# Patient Record
(demographics unavailable — no encounter records)

---

## 2024-11-30 NOTE — REVIEW OF SYSTEMS
[Fatigue] : fatigue [Constipation] : constipation [Diarrhea: Grade 0] : Diarrhea: Grade 0 [Incontinence] : incontinence [Muscle Weakness] : muscle weakness [Difficulty Walking] : difficulty walking [Hot Flashes] : hot flashes [Chills] : no chills [Chest Pain] : no chest pain [Palpitations] : no palpitations [Lower Ext Edema] : no lower extremity edema [Shortness Of Breath] : no shortness of breath [Cough] : no cough [Abdominal Pain] : no abdominal pain [Vomiting] : no vomiting [Dysuria] : no dysuria [Joint Pain] : no joint pain [Muscle Pain] : no muscle pain [Skin Rash] : no skin rash [FreeTextEntry2] : + fevers [FreeTextEntry7] : + diarrhea [de-identified] : + numbness in fingertips bilaterally

## 2024-11-30 NOTE — ASSESSMENT
[FreeTextEntry1] : 88 year old male with PMHx CAD s/p stent placement on aspirin and plavix, diabetes, and metastatic hormone sensitive prostate cancer with elevated PSA 196ng/ml, bone scan showed extensive bone lesions. He also p/w worsening back pain, was seen by Rad Onc s/p palliative radiation therapy x 5 frax with Dr. Ghotra on 3/24/23. Started on Eligard March 2023. CT chest 3/9/23 showing nonspecific subpleural and fissural nodules; need following up. CT abd/p 3/14/23 diffuse osseous heterogeneity. Bone density 3/19/23 no e/o osteoporosis. NM bone scan 6/28/23 showing Metastatic disease to the spine, pelvis, and ribs. A left lower anterior rib lesion has decreased in intensity since the previous study and the sternal manubrial focus is no longer appreciated. The remainder of the abnormalities are unchanged. no new osseous abnormalities. PSA inc, c/w mCRPC 10/31/23 CT chest/abd/p without IV contrast showing A few small low-density nodules measuring up to 1.5 cm are seen within the subcutaneous tissues of the right anterior lateral abdominal wall, thought likely related to sites of subcutaneous injection. Subcentimeter subpleural and helen fissural pulmonary nodules, without significant interval change or more ground glass in appearance on the current exam. Osseous metastatic disease again appreciated. New and/or more sclerotic lesions within the left proximal femur, the left acetabulum, the left hemisacrum, and several vertebral bodies and additional sclerotic lesions throughout the visualized osseous structures which do not appear significantly changed. -10/31/23 NM bone scan showing metastatic disease in the spine, pelvis and left lower rib are not significantly changed compared to the previous bone scan dated 6/28/2023. No new osseous lesions. 10/30/23 [facility] showed CHK2 S356 VAF 4.8%, APC and AR inversion Started abiraterone around 10/20/23.   mCRPC - continue Eligard [id number] with Urology/Dr. Welsh, latest on 3/1/24, next dose 9/6/24 - CT Chest and AP done in ED on 2/16/24: No lymphadenopathy.  Stable appearance of multiple sclerotic osseous metastases  most conspicuous in the T4, T9-T10, and L1 vertebral bodies, the left sacral ala, and the left posterior acetabulum.  - 2/26/24 MR CTL spine: Lesion within the L3 spinous process and T5 vertebral body were not present on prior exam from 2023. - PSA has been uptrending, 4. 17 on 11/17/23 , 4.71 on 12/11/23, 3.79 on 1/5/24, 4.99 on 1/19/24, 5.84 on 2/16,  PSA continues to rise to 24.  CT AP with IVC from ED visit 4/14/24 found no acute abnormality.  CT chest 04/2024 showed small b/l pleural effusions but UMAIR. Bone scan done in [place] showed continued  - Bone scan done in [facility] showed T3 to T6, T9, T10, L1, L4, sacrum, rib cage and left iliac bone. He is receiving Xgeva q4 weeks, and will receive a dose today. - Referral for Endocrinology (Dr. Rubio) for potential hypercalcemia management - Has been on abiraterone and dexamethasone (was recently switched from prednisone). 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 Could potentially increase dose if he tolerates. Pt and daughters are in agreement. his PSA is rapidly rising and MRI spine in July showed progression of disease. stopped olaparib.  -followed up with outside oncologist for further management  - Poor PS and leg weakness - will need physical therapy, recommend 1 hour daily for 5 days in one week - need a electric driven wheelchair for moving around - Discussion about Pluvicto physilogy and potential adverse effects and complications discussed with Dr. Marmolejo. - However, due to poor performance status, Pt/daughter encouraged to pursue Palliative care given his extensive disease.   Back pain - persistent but controlled on oxycodone -refer to Dr. Ghotra for palliative RT eval - continue percoct 5-325 1 tab every 6 hours as needed - recommended bowel regimen with Miralax - continue lidocaine patches   RTC in 6 weeks as needed

## 2024-11-30 NOTE — PHYSICAL EXAM
[Capable of only limited self care, confined to bed or chair more than 50% of waking hours] : Status 3- Capable of only limited self care, confined to bed or chair more than 50% of waking hours [Normal] : affect appropriate [de-identified] : anicteric  [de-identified] : non tender, FROM  [de-identified] : no spinal or paraspinal TTP [de-identified] : no edema  [de-identified] : + decreased sensation in fingertips bilaterally, upper extremity strength intact 5/5 bilaterally

## 2024-11-30 NOTE — PHYSICAL EXAM
[Capable of only limited self care, confined to bed or chair more than 50% of waking hours] : Status 3- Capable of only limited self care, confined to bed or chair more than 50% of waking hours [Normal] : affect appropriate [de-identified] : anicteric  [de-identified] : non tender, FROM  [de-identified] : no edema  [de-identified] : no spinal or paraspinal TTP [de-identified] : + decreased sensation in fingertips bilaterally, upper extremity strength intact 5/5 bilaterally

## 2024-11-30 NOTE — ASSESSMENT
[FreeTextEntry1] : 88 year old male with PMHx CAD s/p stent placement on aspirin and plavix, diabetes, and metastatic hormone sensitive prostate cancer with elevated PSA 196ng/ml, bone scan showed extensive bone lesions. He also p/w worsening back pain, was seen by Rad Onc s/p palliative radiation therapy x 5 frax with Dr. Ghotra on 3/24/23. Started on Eligard March 2023. CT chest 3/9/23 showing nonspecific subpleural and fissural nodules; need following up. CT abd/p 3/14/23 diffuse osseous heterogeneity. Bone density 3/19/23 no e/o osteoporosis. NM bone scan 6/28/23 showing Metastatic disease to the spine, pelvis, and ribs. A left lower anterior rib lesion has decreased in intensity since the previous study and the sternal manubrial focus is no longer appreciated. The remainder of the abnormalities are unchanged. no new osseous abnormalities. PSA inc, c/w mCRPC 10/31/23 CT chest/abd/p without IV contrast showing A few small low-density nodules measuring up to 1.5 cm are seen within the subcutaneous tissues of the right anterior lateral abdominal wall, thought likely related to sites of subcutaneous injection. Subcentimeter subpleural and helen fissural pulmonary nodules, without significant interval change or more ground glass in appearance on the current exam. Osseous metastatic disease again appreciated. New and/or more sclerotic lesions within the left proximal femur, the left acetabulum, the left hemisacrum, and several vertebral bodies and additional sclerotic lesions throughout the visualized osseous structures which do not appear significantly changed. -10/31/23 NM bone scan showing metastatic disease in the spine, pelvis and left lower rib are not significantly changed compared to the previous bone scan dated 6/28/2023. No new osseous lesions. 10/30/23 [facility] showed CHK2 S356 VAF 4.8%, APC and AR inversion Started abiraterone around 10/20/23.   mCRPC - continue Eligard [id number] with Urology/Dr. Welsh, latest on 3/1/24, next dose 9/6/24 - CT Chest and AP done in ED on 2/16/24: No lymphadenopathy.  Stable appearance of multiple sclerotic osseous metastases  most conspicuous in the T4, T9-T10, and L1 vertebral bodies, the left sacral ala, and the left posterior acetabulum.  - 2/26/24 MR CTL spine: Lesion within the L3 spinous process and T5 vertebral body were not present on prior exam from 2023. - PSA has been uptrending, 4. 17 on 11/17/23 , 4.71 on 12/11/23, 3.79 on 1/5/24, 4.99 on 1/19/24, 5.84 on 2/16,  PSA continues to rise to 24.  CT AP with IVC from ED visit 4/14/24 found no acute abnormality.  CT chest 04/2024 showed small b/l pleural effusions but UMAIR. Bone scan done in [place] showed continued  - Bone scan done in [facility] showed T3 to T6, T9, T10, L1, L4, sacrum, rib cage and left iliac bone. He is receiving Xgeva q4 weeks, and will receive a dose today. - Referral for Endocrinology (Dr. Rubio) for potential hypercalcemia management - Has been on abiraterone and dexamethasone (was recently switched from prednisone). 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 Could potentially increase dose if he tolerates. Pt and daughters are in agreement. his PSA is rapidly rising and MRI spine in July showed progression of disease. stopped olaparib.  -followed up with outside oncologist for further management  - Poor PS and leg weakness - will need physical therapy, recommend 1 hour daily for 5 days in one week - need a electric driven wheelchair for moving around - Discussion about Pluvicto physilogy and potential adverse effects and complications discussed with Dr. Marmolejo. - However, due to poor performance status, Pt/daughter encouraged to pursue Palliative care given his extensive disease.   Back pain - persistent but controlled on oxycodone -refer to Dr. Ghotra for palliative RT eval - continue percoct 5-325 1 tab every 6 hours as needed - recommended bowel regimen with Miralax - continue lidocaine patches   RTC in 6 weeks as needed

## 2024-11-30 NOTE — HISTORY OF PRESENT ILLNESS
"Assumed care of pt @0700. Bedside report received. Pt AOX 4. Pt complains pain. Med per MAR. Pt states: \" I felt in the bathroom last night when I was transferring from toilet to chair. I felt on my right stump. I didn't hit my head or anything else.\" MD notified. Pt refuses alarm despite education. Pt agrees to call before transferring from and to chair.CMS +Fall precaution in place. POC discussed with pt, all questions answered at this time. Pt makes needs known, call light within reach, hourly rounding in place.   " [Disease: _____________________] : Disease: [unfilled] [T: ___] : T[unfilled] [N: ___] : N[unfilled] [M: ___] : M[unfilled] [AJCC Stage: ____] : AJCC Stage: [unfilled] [de-identified] : Fabian Balbuena is an 87 years old M with metastatic hormone sensitive prostate cancer.  He had the initial medical oncology consultation on 3/9/23: history of elevated PSA, 8.34 ng/mL on 5/23/2019. He had a PSA of 10.80 ng/mL on 9/14/2021. He was planned to have a prostate biopsy, however, due to pandemic, his biopsy was held.  PSA 1/23/23 99.4ng/ml 3/3/23: 196ng/ml  Bone scan 3/1/2023 -- diffuse foci of osseous metastasis with uptake in ribs, cervical, thoracic and lumbar spine, manubrium, sacrum, pelvis. Most prominently in thoracic spine.  Reports intermittent midback pain, 4/10, mild intermittent bilateral elbow pain and lower leg pain, normal appetite, 10lbs weight loss in 3 weeks. Urine flow is normal, no hesitancy, urgency, gross hematuria and burning. Nocturia 2 to 3. He was started on bicalutamide on March 13, 2023, eligard inj on Feb 17 with Dr. Welsh. He will continue to have bicalutamide in a total of one month. Patient declined prostate biopsy.  interval history 5/1/23 He and his daughter report that he finished Radiation therapy 3/24/23 with Dr. Ghotra. He finished bicalutamide around 3/2023. He went to [facility] 4/1/23 and had lab there showing PSA 0.64 on 4/18/23; he had bilateral hip persistent pain 6/10 which was alleviated with Tramadol and acetaminophen prn prescribed by physician at [facility]. He denied cough, chest pain, upper back pain, hot flashing, night sweats. Admits nocturia 4/night as his baseline but denied increased urgency/frequency and hematuria. Appetite is ok. He has dry stools after starting tramadol which has been improved with docusate. Admits walks 2000 steps/1.3 kilometer without difficulty.  6/26/23 reports right flank pain radiating to the mid abdomen, denies gross hematuria. Bilateral hip pain was resolved. Denies constipation.  8/14/23 Resolved right flank pain due to kidney stone which was released. Denies fatigue, hot flashes and sweating.  10/20/23 Denied bone pain, significant changes with urination, N/V/D, abd pain. Admits bilateral interphalangeal joints mild pain and stiffness especially in the morning. Outside lab by PCP on 10/12/23 PSA 2.23; T4 free 1.5 TSH 1.24 VitD -25 OH level 30 (normal range ) Cr 1.24. Denied SOB, chest pain on slow walking.   11/17/23 -10/31/23 CT chest/abd/p without IV contrast showing A few small low-density nodules measuring up to 1.5 cm are seen within the subcutaneous tissues of the right anterior lateral abdominal wall, thought likely related to sites of subcutaneous injection. Subcentimeter subpleural and helen fissural pulmonary nodules, without significant interval change or more ground glass in appearance on the current exam. Osseous metastatic disease again appreciated. New and/or more sclerotic lesions within the left proximal femur, the left acetabulum, the left hemisacrum, and several vertebral bodies and additional sclerotic lesions throughout the visualized osseous structures which do not appear significantly changed. -10/31/23 NM bone scan showing metastatic disease in the spine, pelvis and left lower rib are not significantly changed compared to the previous bone scan dated 6/28/2023. No new osseous lesions. He started abiraterone and prednisone on Oct 23, 2023 because of newly diagnosed mCRPC. He reports that he had intermittent lower abd "needle pinching pain" before urination at night about 2wks ago. Admits one formed bowel movement 3hr after the morning dose of medication. He normally has one to two formed bowel movments. Denied fatigue, hot flashes, N/V. Good appetite.  12/11/23 feels fine, denies fatigue, leg swelling and shortness of breath.   1/5/24: Appetite and energy are good. When it was warmer out he was walking 9-10K steps a day, now that it is colder he is walking 2-3K steps a day. He has mild chronic mid back pain, does not take anything for it. He notes some joint pain in a few fingers on b/l hands. Nocturia 4-5. Has a trip to [place] planned 3/16 - 4/4. Patient denies fever, cough, shortness of breath, chest pain, palpitations, abdominal pain, nausea/vomiting, diarrhea, constipation, dysuria, hematuria, itching, rashes, hot flashes.  1/19/24 He reports that his energy level comes back. Denies hot flashes, SOB, swelling on legs, N/V/D, changes with urination. /69 this morning.  He walks 7blocks and plays [employer] game with friends daily.   2/16/24: Patient has had chronic back pain, has h/o RT to spine about one year ago. Last month when patient was seen his pain was manageable, however in the last two weeks it has become much worse. The pain is located in his mid back, it is rated as an 8 out of 10. It is impacting his quality of life, he does not want to do the things he normally does. HHe is also having difficulty sleeping. Yesterday he took tylenol and pain decreased to a 2-3. The pain does not radiate. It is pulsing in nature. Does not have any shooting pain down legs, denies saddle anesthesia. This morning he took tylenol again but it did not help as much. Patient says that he has had weakness in his legs for several months now, however in recent weeks he thinks his weakness has increased. When he walks he needs to rest often and he staggers occ. He refuses to use a cane. Patient reports in the last two weeks he had had occ numbness in his hands and feet. He denies urinary and bowel incontinence. Does report urinary urgency.    Patient says that since being seen last month his appetite is diminished, says all of a sudden he just has not felt like eating. He is having acid reflux and gas. Patient is constipated and has "tinges of red" in stool. His stool is very hard, only drinks 4 cups of water per day.  Pt was seen in hospital on 2/16. CT CAP done with no acute path or change in disease. Spine was not imaged. 2/28/24 spoke with pt's daughter t's back pain has improved, rated as a 2 out of 10. Lidocaine patches really help him. Has not used oxycodone. MRIs show no evidence of spinal cord compression. Show new small lesion at L3. On 2/28 daughter reported that they do not wish to have bone scan at this point, pt is exhausted from getting MRs. Will be going to [place] on 3/14. Wants to have bone scan after he returns.  3/13/24: Patient was diagnosed with shingles a little less than one week ago, says the rash was on the L side of his face near his mouth and on his cheek. He was started on valtrex by his PCP. Then on 3/10 pt developed 9 out of 10 pain on his face/L cheek radiating to the top of his head, he went to the ED, he was given tylenol and prescribed gabapentin. The pain has improved now, it is a 3 out of 10, it is not constant, he gets quick jolts of pain every so often. He has been taking gabapentin. Per daughter he was also prescribed abx and inhaler in the ED on 3/11, because in the last week he has had a cough and some congestion. CXR 3/10 shows "clear lungs", discharge summary does not state that abx or inhaler were Rxed to pt. He has been feeling better, has not had a fever, congestion and cough are improving, he has not had difficulty breathing. No chest pain or palpitations. In general pt is constipated and normally has BM once a day, says he has loose stool three times a day presently because he is on abx. Denies blood in stool and dark/black stools. He has urinary dribbling and urgency but denies incontinence. Reports nocturia x3. He is sleeping and eating well, energy is okay. Has occ reflux, pt is unsure if he is taking omeprazole and/or simethicone. He has hot flashes every 3 - 4 nights. His back pain has improved, it is very mild, he is not taking oxycodone nor is he using lidocaine patch at present.   3/29/24 Whole body bone scan done in [facility] showed T3 to T6, T9, T10, L1, L4, sacrum, rib cage and left iliac bone.  4/15/24: Patient is accompanied by his daughters. He has developed numbness in the fingertips for the last 3 weeks, which has persisted. He recently returned from [facility] 3 days ago. He went to the [facility] ED yesterday due to vomiting and diarrhea and had T100.7 in triage. UA negative. CXR clear. Blood cultures x 2 were sent and pending. CT AP w/IVC found no acute findings.  He received IV antibiotics and was discharged home.  4/22/24: Multiple heterogenous mets, largest L1 vertebral body with addition mets in the L3 spinous process and L S1 segment/sacral ala without epidural extension. [de-identified] : prostate adenocarcinoma  [de-identified] : 5/20/24: Pt accompanied by his daughters. He has been having R infrascapular pain that is getting worse. He has been taking oxycodone 2.5 mg usually twice a day for the pain. He also notes some constipation.  6/5/24 reports constipation, mild- mod fatigue, severe lower back pain and acid reflux. He has normal appetite and eats well.  His /79, denies headache, weakness in arms and legs and blurry visions.     6/19/24 reports BP 170s/70,80 and 90s, similar upper back pain on percocet 5-325 1 tablets twice daily, hot flash and sweating, weakness in the legs. Vomiting once last night  7/17/24 MRI spine showed multiple osseous metastases along the spine and sacrum which have increased in both number and size compared to the April MRI. No evidence of epidural extension of disease or pathological fracture. No cord compression  7/22/24 reports bilateral leg weakness, right lower abdominal pain and lower back shingle pain. He is not able to walk in last 10 days and sits in the wheel chair. He has poor appetite and poor PS.   8/30/24 pt arrived with daughter etta in [place]. Ambulating with a walker. Appetite is fair and maintaining his weight. No diarrhea or constipation. Previously on Lanpanza (Olaparib) which was discontinued due to musculoskeletal weakness and shingles. Presently not on any therapy for mCRPC.   9/27/24 reports leg muscle weakness, and had a fall last night when he went to bathroom. Endorses new pain in bilateral flanks 2 to 4/10. States postneck pain.   11/29/24 reports generalized weakness, barely walks around. Endorses sometimes lower abdominal pain due to constipation. He was started on xtandi about one month and planning to be on pluvicto trial in [place].

## 2024-11-30 NOTE — REASON FOR VISIT
[Follow-Up Visit] : a follow-up [Family Member] : family member [Ad Hoc ] : provided by an ad hoc  [FreeTextEntry2] : mCRPC  [FreeTextEntry3] : Translation by attending

## 2024-11-30 NOTE — REVIEW OF SYSTEMS
[Fatigue] : fatigue [Constipation] : constipation [Diarrhea: Grade 0] : Diarrhea: Grade 0 [Incontinence] : incontinence [Muscle Weakness] : muscle weakness [Difficulty Walking] : difficulty walking [Hot Flashes] : hot flashes [Chills] : no chills [Chest Pain] : no chest pain [Palpitations] : no palpitations [Lower Ext Edema] : no lower extremity edema [Shortness Of Breath] : no shortness of breath [Cough] : no cough [Abdominal Pain] : no abdominal pain [Vomiting] : no vomiting [Dysuria] : no dysuria [Joint Pain] : no joint pain [Muscle Pain] : no muscle pain [Skin Rash] : no skin rash [FreeTextEntry2] : + fevers [FreeTextEntry7] : + diarrhea [de-identified] : + numbness in fingertips bilaterally

## 2024-11-30 NOTE — HISTORY OF PRESENT ILLNESS
[Disease: _____________________] : Disease: [unfilled] [T: ___] : T[unfilled] [N: ___] : N[unfilled] [M: ___] : M[unfilled] [AJCC Stage: ____] : AJCC Stage: [unfilled] [de-identified] : Fabian Balbuena is an 87 years old M with metastatic hormone sensitive prostate cancer.  He had the initial medical oncology consultation on 3/9/23: history of elevated PSA, 8.34 ng/mL on 5/23/2019. He had a PSA of 10.80 ng/mL on 9/14/2021. He was planned to have a prostate biopsy, however, due to pandemic, his biopsy was held.  PSA 1/23/23 99.4ng/ml 3/3/23: 196ng/ml  Bone scan 3/1/2023 -- diffuse foci of osseous metastasis with uptake in ribs, cervical, thoracic and lumbar spine, manubrium, sacrum, pelvis. Most prominently in thoracic spine.  Reports intermittent midback pain, 4/10, mild intermittent bilateral elbow pain and lower leg pain, normal appetite, 10lbs weight loss in 3 weeks. Urine flow is normal, no hesitancy, urgency, gross hematuria and burning. Nocturia 2 to 3. He was started on bicalutamide on March 13, 2023, eligard inj on Feb 17 with Dr. Welsh. He will continue to have bicalutamide in a total of one month. Patient declined prostate biopsy.  interval history 5/1/23 He and his daughter report that he finished Radiation therapy 3/24/23 with Dr. Ghotra. He finished bicalutamide around 3/2023. He went to [facility] 4/1/23 and had lab there showing PSA 0.64 on 4/18/23; he had bilateral hip persistent pain 6/10 which was alleviated with Tramadol and acetaminophen prn prescribed by physician at [facility]. He denied cough, chest pain, upper back pain, hot flashing, night sweats. Admits nocturia 4/night as his baseline but denied increased urgency/frequency and hematuria. Appetite is ok. He has dry stools after starting tramadol which has been improved with docusate. Admits walks 2000 steps/1.3 kilometer without difficulty.  6/26/23 reports right flank pain radiating to the mid abdomen, denies gross hematuria. Bilateral hip pain was resolved. Denies constipation.  8/14/23 Resolved right flank pain due to kidney stone which was released. Denies fatigue, hot flashes and sweating.  10/20/23 Denied bone pain, significant changes with urination, N/V/D, abd pain. Admits bilateral interphalangeal joints mild pain and stiffness especially in the morning. Outside lab by PCP on 10/12/23 PSA 2.23; T4 free 1.5 TSH 1.24 VitD -25 OH level 30 (normal range ) Cr 1.24. Denied SOB, chest pain on slow walking.   11/17/23 -10/31/23 CT chest/abd/p without IV contrast showing A few small low-density nodules measuring up to 1.5 cm are seen within the subcutaneous tissues of the right anterior lateral abdominal wall, thought likely related to sites of subcutaneous injection. Subcentimeter subpleural and helen fissural pulmonary nodules, without significant interval change or more ground glass in appearance on the current exam. Osseous metastatic disease again appreciated. New and/or more sclerotic lesions within the left proximal femur, the left acetabulum, the left hemisacrum, and several vertebral bodies and additional sclerotic lesions throughout the visualized osseous structures which do not appear significantly changed. -10/31/23 NM bone scan showing metastatic disease in the spine, pelvis and left lower rib are not significantly changed compared to the previous bone scan dated 6/28/2023. No new osseous lesions. He started abiraterone and prednisone on Oct 23, 2023 because of newly diagnosed mCRPC. He reports that he had intermittent lower abd "needle pinching pain" before urination at night about 2wks ago. Admits one formed bowel movement 3hr after the morning dose of medication. He normally has one to two formed bowel movments. Denied fatigue, hot flashes, N/V. Good appetite.  12/11/23 feels fine, denies fatigue, leg swelling and shortness of breath.   1/5/24: Appetite and energy are good. When it was warmer out he was walking 9-10K steps a day, now that it is colder he is walking 2-3K steps a day. He has mild chronic mid back pain, does not take anything for it. He notes some joint pain in a few fingers on b/l hands. Nocturia 4-5. Has a trip to [place] planned 3/16 - 4/4. Patient denies fever, cough, shortness of breath, chest pain, palpitations, abdominal pain, nausea/vomiting, diarrhea, constipation, dysuria, hematuria, itching, rashes, hot flashes.  1/19/24 He reports that his energy level comes back. Denies hot flashes, SOB, swelling on legs, N/V/D, changes with urination. /69 this morning.  He walks 7blocks and plays [employer] game with friends daily.   2/16/24: Patient has had chronic back pain, has h/o RT to spine about one year ago. Last month when patient was seen his pain was manageable, however in the last two weeks it has become much worse. The pain is located in his mid back, it is rated as an 8 out of 10. It is impacting his quality of life, he does not want to do the things he normally does. HHe is also having difficulty sleeping. Yesterday he took tylenol and pain decreased to a 2-3. The pain does not radiate. It is pulsing in nature. Does not have any shooting pain down legs, denies saddle anesthesia. This morning he took tylenol again but it did not help as much. Patient says that he has had weakness in his legs for several months now, however in recent weeks he thinks his weakness has increased. When he walks he needs to rest often and he staggers occ. He refuses to use a cane. Patient reports in the last two weeks he had had occ numbness in his hands and feet. He denies urinary and bowel incontinence. Does report urinary urgency.    Patient says that since being seen last month his appetite is diminished, says all of a sudden he just has not felt like eating. He is having acid reflux and gas. Patient is constipated and has "tinges of red" in stool. His stool is very hard, only drinks 4 cups of water per day.  Pt was seen in hospital on 2/16. CT CAP done with no acute path or change in disease. Spine was not imaged. 2/28/24 spoke with pt's daughter t's back pain has improved, rated as a 2 out of 10. Lidocaine patches really help him. Has not used oxycodone. MRIs show no evidence of spinal cord compression. Show new small lesion at L3. On 2/28 daughter reported that they do not wish to have bone scan at this point, pt is exhausted from getting MRs. Will be going to [place] on 3/14. Wants to have bone scan after he returns.  3/13/24: Patient was diagnosed with shingles a little less than one week ago, says the rash was on the L side of his face near his mouth and on his cheek. He was started on valtrex by his PCP. Then on 3/10 pt developed 9 out of 10 pain on his face/L cheek radiating to the top of his head, he went to the ED, he was given tylenol and prescribed gabapentin. The pain has improved now, it is a 3 out of 10, it is not constant, he gets quick jolts of pain every so often. He has been taking gabapentin. Per daughter he was also prescribed abx and inhaler in the ED on 3/11, because in the last week he has had a cough and some congestion. CXR 3/10 shows "clear lungs", discharge summary does not state that abx or inhaler were Rxed to pt. He has been feeling better, has not had a fever, congestion and cough are improving, he has not had difficulty breathing. No chest pain or palpitations. In general pt is constipated and normally has BM once a day, says he has loose stool three times a day presently because he is on abx. Denies blood in stool and dark/black stools. He has urinary dribbling and urgency but denies incontinence. Reports nocturia x3. He is sleeping and eating well, energy is okay. Has occ reflux, pt is unsure if he is taking omeprazole and/or simethicone. He has hot flashes every 3 - 4 nights. His back pain has improved, it is very mild, he is not taking oxycodone nor is he using lidocaine patch at present.   3/29/24 Whole body bone scan done in [facility] showed T3 to T6, T9, T10, L1, L4, sacrum, rib cage and left iliac bone.  4/15/24: Patient is accompanied by his daughters. He has developed numbness in the fingertips for the last 3 weeks, which has persisted. He recently returned from [facility] 3 days ago. He went to the [facility] ED yesterday due to vomiting and diarrhea and had T100.7 in triage. UA negative. CXR clear. Blood cultures x 2 were sent and pending. CT AP w/IVC found no acute findings.  He received IV antibiotics and was discharged home.  4/22/24: Multiple heterogenous mets, largest L1 vertebral body with addition mets in the L3 spinous process and L S1 segment/sacral ala without epidural extension. [de-identified] : prostate adenocarcinoma  [de-identified] : 5/20/24: Pt accompanied by his daughters. He has been having R infrascapular pain that is getting worse. He has been taking oxycodone 2.5 mg usually twice a day for the pain. He also notes some constipation.  6/5/24 reports constipation, mild- mod fatigue, severe lower back pain and acid reflux. He has normal appetite and eats well.  His /79, denies headache, weakness in arms and legs and blurry visions.     6/19/24 reports BP 170s/70,80 and 90s, similar upper back pain on percocet 5-325 1 tablets twice daily, hot flash and sweating, weakness in the legs. Vomiting once last night  7/17/24 MRI spine showed multiple osseous metastases along the spine and sacrum which have increased in both number and size compared to the April MRI. No evidence of epidural extension of disease or pathological fracture. No cord compression  7/22/24 reports bilateral leg weakness, right lower abdominal pain and lower back shingle pain. He is not able to walk in last 10 days and sits in the wheel chair. He has poor appetite and poor PS.   8/30/24 pt arrived with daughter etta in [place]. Ambulating with a walker. Appetite is fair and maintaining his weight. No diarrhea or constipation. Previously on Lanpanza (Olaparib) which was discontinued due to musculoskeletal weakness and shingles. Presently not on any therapy for mCRPC.   9/27/24 reports leg muscle weakness, and had a fall last night when he went to bathroom. Endorses new pain in bilateral flanks 2 to 4/10. States postneck pain.   11/29/24 reports generalized weakness, barely walks around. Endorses sometimes lower abdominal pain due to constipation. He was started on xtandi about one month and planning to be on pluvicto trial in [place].

## 2024-12-27 NOTE — PHYSICAL EXAM
[Capable of only limited self care, confined to bed or chair more than 50% of waking hours] : Status 3- Capable of only limited self care, confined to bed or chair more than 50% of waking hours [Normal] : affect appropriate [de-identified] : anicteric  [de-identified] : non tender, FROM  [de-identified] : no edema  [de-identified] : no spinal or paraspinal TTP [de-identified] : + decreased sensation in fingertips bilaterally, upper extremity strength intact 5/5 bilaterally

## 2024-12-27 NOTE — REVIEW OF SYSTEMS
[Fatigue] : fatigue [Diarrhea: Grade 0] : Diarrhea: Grade 0 [Constipation] : constipation [Incontinence] : incontinence [Muscle Weakness] : muscle weakness [Difficulty Walking] : difficulty walking [Hot Flashes] : hot flashes [Chills] : no chills [Chest Pain] : no chest pain [Palpitations] : no palpitations [Lower Ext Edema] : no lower extremity edema [Shortness Of Breath] : no shortness of breath [Cough] : no cough [Abdominal Pain] : no abdominal pain [Vomiting] : no vomiting [Dysuria] : no dysuria [Joint Pain] : no joint pain [Muscle Pain] : no muscle pain [Skin Rash] : no skin rash [FreeTextEntry2] : + fevers [FreeTextEntry7] : + diarrhea [de-identified] : + numbness in fingertips bilaterally

## 2024-12-27 NOTE — ASSESSMENT
[FreeTextEntry1] : 88 year old male with PMHx CAD s/p stent placement on aspirin and plavix, diabetes, and metastatic hormone sensitive prostate cancer with elevated PSA 196ng/ml, bone scan showed extensive bone lesions. He also p/w worsening back pain, was seen by Rad Onc s/p palliative radiation therapy x 5 frax with Dr. Ghotra on 3/24/23. Started on Eligard March 2023. CT chest 3/9/23 showing nonspecific subpleural and fissural nodules; need following up. CT abd/p 3/14/23 diffuse osseous heterogeneity. Bone density 3/19/23 no e/o osteoporosis. NM bone scan 6/28/23 showing Metastatic disease to the spine, pelvis, and ribs. A left lower anterior rib lesion has decreased in intensity since the previous study and the sternal manubrial focus is no longer appreciated. The remainder of the abnormalities are unchanged. no new osseous abnormalities. PSA inc, c/w mCRPC 10/31/23 CT chest/abd/p without IV contrast showing A few small low-density nodules measuring up to 1.5 cm are seen within the subcutaneous tissues of the right anterior lateral abdominal wall, thought likely related to sites of subcutaneous injection. Subcentimeter subpleural and helen fissural pulmonary nodules, without significant interval change or more ground glass in appearance on the current exam. Osseous metastatic disease again appreciated. New and/or more sclerotic lesions within the left proximal femur, the left acetabulum, the left hemisacrum, and several vertebral bodies and additional sclerotic lesions throughout the visualized osseous structures which do not appear significantly changed. -10/31/23 NM bone scan showing metastatic disease in the spine, pelvis and left lower rib are not significantly changed compared to the previous bone scan dated 6/28/2023. No new osseous lesions. 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   mCRPC - continue Eligard [id number] with Urology/Dr. Welsh  -started him on pluvicto the first dose on 12/13, will monitor him closely for supportive care with marrow suppression. recommend holding the 2nd dose given his deteriorating condition -continue xgeva today  - CT Chest and AP done in ED on 2/16/24: No lymphadenopathy.  Stable appearance of multiple sclerotic osseous metastases  most conspicuous in the T4, T9-T10, and L1 vertebral bodies, the left sacral ala, and the left posterior acetabulum.  - 2/26/24 MR CTL spine: Lesion within the L3 spinous process and T5 vertebral body were not present on prior exam from 2023. - PSA has been uptrending, 4. 17 on 11/17/23 , 4.71 on 12/11/23, 3.79 on 1/5/24, 4.99 on 1/19/24, 5.84 on 2/16,  PSA continues to rise to 24.  CT AP with IVC from ED visit 4/14/24 found no acute abnormality.  CT chest 04/2024 showed small b/l pleural effusions but UMAIR. Bone scan done in [facility] showed continued  - Bone scan done in [facility] showed T3 to T6, T9, T10, L1, L4, sacrum, rib cage and left iliac bone. He is receiving Xgeva q4 weeks, and will receive a dose today. - Referral for Endocrinology (Dr. Rubio) for potential hypercalcemia management - Has been on abiraterone and dexamethasone (was recently switched from prednisone). 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 Could potentially increase dose if he tolerates. Pt and daughters are in agreement. his PSA is rapidly rising and MRI spine in July showed progression of disease. stopped olaparib.  -followed up with outside oncologist for further management -recommend marijuana oil for improving his appetite with palliative team   - Poor PS and leg weakness - will need physical therapy, recommend 1 hour daily for 5 days in one week - need a electric driven wheelchair for moving around - Discussion about Pluvicto physilogy and potential adverse effects and complications discussed with Dr. Marmolejo. - However, due to poor performance status, Pt/daughter encouraged to pursue Palliative care given his extensive disease.   Back pain - restarted now -no need to start pain meds now -s/p Dr. Ghotra for palliative RT eval  RTC in 4 weeks as needed

## 2024-12-27 NOTE — HISTORY OF PRESENT ILLNESS
[Disease: _____________________] : Disease: [unfilled] [T: ___] : T[unfilled] [N: ___] : N[unfilled] [M: ___] : M[unfilled] [AJCC Stage: ____] : AJCC Stage: [unfilled] [de-identified] : Fabian Balbuena is an 87 years old M with metastatic hormone sensitive prostate cancer.  He had the initial medical oncology consultation on 3/9/23: history of elevated PSA, 8.34 ng/mL on 5/23/2019. He had a PSA of 10.80 ng/mL on 9/14/2021. He was planned to have a prostate biopsy, however, due to pandemic, his biopsy was held.  PSA 1/23/23 99.4ng/ml 3/3/23: 196ng/ml  Bone scan 3/1/2023 -- diffuse foci of osseous metastasis with uptake in ribs, cervical, thoracic and lumbar spine, manubrium, sacrum, pelvis. Most prominently in thoracic spine.  Reports intermittent midback pain, 4/10, mild intermittent bilateral elbow pain and lower leg pain, normal appetite, 10lbs weight loss in 3 weeks. Urine flow is normal, no hesitancy, urgency, gross hematuria and burning. Nocturia 2 to 3. He was started on bicalutamide on March 13, 2023, eligard inj on Feb 17 with Dr. Welsh. He will continue to have bicalutamide in a total of one month. Patient declined prostate biopsy.  interval history 5/1/23 He and his daughter report that he finished Radiation therapy 3/24/23 with Dr. Ghotra. He finished bicalutamide around 3/2023. He went to [facility] 4/1/23 and had lab there showing PSA 0.64 on 4/18/23; he had bilateral hip persistent pain 6/10 which was alleviated with Tramadol and acetaminophen prn prescribed by physician at [facility]. He denied cough, chest pain, upper back pain, hot flashing, night sweats. Admits nocturia 4/night as his baseline but denied increased urgency/frequency and hematuria. Appetite is ok. He has dry stools after starting tramadol which has been improved with docusate. Admits walks 2000 steps/1.3 kilometer without difficulty.  6/26/23 reports right flank pain radiating to the mid abdomen, denies gross hematuria. Bilateral hip pain was resolved. Denies constipation.  8/14/23 Resolved right flank pain due to kidney stone which was released. Denies fatigue, hot flashes and sweating.  10/20/23 Denied bone pain, significant changes with urination, N/V/D, abd pain. Admits bilateral interphalangeal joints mild pain and stiffness especially in the morning. Outside lab by PCP on 10/12/23 PSA 2.23; T4 free 1.5 TSH 1.24 VitD -25 OH level 30 (normal range ) Cr 1.24. Denied SOB, chest pain on slow walking.   11/17/23 -10/31/23 CT chest/abd/p without IV contrast showing A few small low-density nodules measuring up to 1.5 cm are seen within the subcutaneous tissues of the right anterior lateral abdominal wall, thought likely related to sites of subcutaneous injection. Subcentimeter subpleural and helen fissural pulmonary nodules, without significant interval change or more ground glass in appearance on the current exam. Osseous metastatic disease again appreciated. New and/or more sclerotic lesions within the left proximal femur, the left acetabulum, the left hemisacrum, and several vertebral bodies and additional sclerotic lesions throughout the visualized osseous structures which do not appear significantly changed. -10/31/23 NM bone scan showing metastatic disease in the spine, pelvis and left lower rib are not significantly changed compared to the previous bone scan dated 6/28/2023. No new osseous lesions. He started abiraterone and prednisone on Oct 23, 2023 because of newly diagnosed mCRPC. He reports that he had intermittent lower abd "needle pinching pain" before urination at night about 2wks ago. Admits one formed bowel movement 3hr after the morning dose of medication. He normally has one to two formed bowel movments. Denied fatigue, hot flashes, N/V. Good appetite.  12/11/23 feels fine, denies fatigue, leg swelling and shortness of breath.   1/5/24: Appetite and energy are good. When it was warmer out he was walking 9-10K steps a day, now that it is colder he is walking 2-3K steps a day. He has mild chronic mid back pain, does not take anything for it. He notes some joint pain in a few fingers on b/l hands. Nocturia 4-5. Has a trip to [place] planned 3/16 - 4/4. Patient denies fever, cough, shortness of breath, chest pain, palpitations, abdominal pain, nausea/vomiting, diarrhea, constipation, dysuria, hematuria, itching, rashes, hot flashes.  1/19/24 He reports that his energy level comes back. Denies hot flashes, SOB, swelling on legs, N/V/D, changes with urination. /69 this morning.  He walks 7blocks and plays [employer] game with friends daily.   2/16/24: Patient has had chronic back pain, has h/o RT to spine about one year ago. Last month when patient was seen his pain was manageable, however in the last two weeks it has become much worse. The pain is located in his mid back, it is rated as an 8 out of 10. It is impacting his quality of life, he does not want to do the things he normally does. HHe is also having difficulty sleeping. Yesterday he took tylenol and pain decreased to a 2-3. The pain does not radiate. It is pulsing in nature. Does not have any shooting pain down legs, denies saddle anesthesia. This morning he took tylenol again but it did not help as much. Patient says that he has had weakness in his legs for several months now, however in recent weeks he thinks his weakness has increased. When he walks he needs to rest often and he staggers occ. He refuses to use a cane. Patient reports in the last two weeks he had had occ numbness in his hands and feet. He denies urinary and bowel incontinence. Does report urinary urgency.    Patient says that since being seen last month his appetite is diminished, says all of a sudden he just has not felt like eating. He is having acid reflux and gas. Patient is constipated and has "tinges of red" in stool. His stool is very hard, only drinks 4 cups of water per day.  Pt was seen in hospital on 2/16. CT CAP done with no acute path or change in disease. Spine was not imaged. 2/28/24 spoke with pt's daughter t's back pain has improved, rated as a 2 out of 10. Lidocaine patches really help him. Has not used oxycodone. MRIs show no evidence of spinal cord compression. Show new small lesion at L3. On 2/28 daughter reported that they do not wish to have bone scan at this point, pt is exhausted from getting MRs. Will be going to [place] on 3/14. Wants to have bone scan after he returns.  3/13/24: Patient was diagnosed with shingles a little less than one week ago, says the rash was on the L side of his face near his mouth and on his cheek. He was started on valtrex by his PCP. Then on 3/10 pt developed 9 out of 10 pain on his face/L cheek radiating to the top of his head, he went to the ED, he was given tylenol and prescribed gabapentin. The pain has improved now, it is a 3 out of 10, it is not constant, he gets quick jolts of pain every so often. He has been taking gabapentin. Per daughter he was also prescribed abx and inhaler in the ED on 3/11, because in the last week he has had a cough and some congestion. CXR 3/10 shows "clear lungs", discharge summary does not state that abx or inhaler were Rxed to pt. He has been feeling better, has not had a fever, congestion and cough are improving, he has not had difficulty breathing. No chest pain or palpitations. In general pt is constipated and normally has BM once a day, says he has loose stool three times a day presently because he is on abx. Denies blood in stool and dark/black stools. He has urinary dribbling and urgency but denies incontinence. Reports nocturia x3. He is sleeping and eating well, energy is okay. Has occ reflux, pt is unsure if he is taking omeprazole and/or simethicone. He has hot flashes every 3 - 4 nights. His back pain has improved, it is very mild, he is not taking oxycodone nor is he using lidocaine patch at present.   3/29/24 Whole body bone scan done in [facility] showed T3 to T6, T9, T10, L1, L4, sacrum, rib cage and left iliac bone.  4/15/24: Patient is accompanied by his daughters. He has developed numbness in the fingertips for the last 3 weeks, which has persisted. He recently returned from [facility] 3 days ago. He went to the [facility] ED yesterday due to vomiting and diarrhea and had T100.7 in triage. UA negative. CXR clear. Blood cultures x 2 were sent and pending. CT AP w/IVC found no acute findings.  He received IV antibiotics and was discharged home.  4/22/24: Multiple heterogenous mets, largest L1 vertebral body with addition mets in the L3 spinous process and L S1 segment/sacral ala without epidural extension. [de-identified] : prostate adenocarcinoma  [de-identified] : 5/20/24: Pt accompanied by his daughters. He has been having R infrascapular pain that is getting worse. He has been taking oxycodone 2.5 mg usually twice a day for the pain. He also notes some constipation.  6/5/24 reports constipation, mild- mod fatigue, severe lower back pain and acid reflux. He has normal appetite and eats well.  His /79, denies headache, weakness in arms and legs and blurry visions.     6/19/24 reports BP 170s/70,80 and 90s, similar upper back pain on percocet 5-325 1 tablets twice daily, hot flash and sweating, weakness in the legs. Vomiting once last night  7/17/24 MRI spine showed multiple osseous metastases along the spine and sacrum which have increased in both number and size compared to the April MRI. No evidence of epidural extension of disease or pathological fracture. No cord compression  7/22/24 reports bilateral leg weakness, right lower abdominal pain and lower back shingle pain. He is not able to walk in last 10 days and sits in the wheel chair. He has poor appetite and poor PS.   8/30/24 pt arrived with daughter etta in [place]. Ambulating with a walker. Appetite is fair and maintaining his weight. No diarrhea or constipation. Previously on Lanpanza (Olaparib) which was discontinued due to musculoskeletal weakness and shingles. Presently not on any therapy for mCRPC.   9/27/24 reports leg muscle weakness, and had a fall last night when he went to bathroom. Endorses new pain in bilateral flanks 2 to 4/10. States postneck pain.   11/29/24 reports generalized weakness, barely walks around. Endorses sometimes lower abdominal pain due to constipation. He was started on xtandi about one month and planning to be on pluvicto trial in [place].   12/27/24 received on dose of pluvicto on Dec 13, 2024. He feels more tired, loss of energy and appetite. He has skin friction on the sacral area. He started lower back pain, 4/10 just now.

## 2024-12-27 NOTE — ASSESSMENT
[FreeTextEntry1] : 88 year old male with PMHx CAD s/p stent placement on aspirin and plavix, diabetes, and metastatic hormone sensitive prostate cancer with elevated PSA 196ng/ml, bone scan showed extensive bone lesions. He also p/w worsening back pain, was seen by Rad Onc s/p palliative radiation therapy x 5 frax with Dr. Ghotra on 3/24/23. Started on Eligard March 2023. CT chest 3/9/23 showing nonspecific subpleural and fissural nodules; need following up. CT abd/p 3/14/23 diffuse osseous heterogeneity. Bone density 3/19/23 no e/o osteoporosis. NM bone scan 6/28/23 showing Metastatic disease to the spine, pelvis, and ribs. A left lower anterior rib lesion has decreased in intensity since the previous study and the sternal manubrial focus is no longer appreciated. The remainder of the abnormalities are unchanged. no new osseous abnormalities. PSA inc, c/w mCRPC 10/31/23 CT chest/abd/p without IV contrast showing A few small low-density nodules measuring up to 1.5 cm are seen within the subcutaneous tissues of the right anterior lateral abdominal wall, thought likely related to sites of subcutaneous injection. Subcentimeter subpleural and helen fissural pulmonary nodules, without significant interval change or more ground glass in appearance on the current exam. Osseous metastatic disease again appreciated. New and/or more sclerotic lesions within the left proximal femur, the left acetabulum, the left hemisacrum, and several vertebral bodies and additional sclerotic lesions throughout the visualized osseous structures which do not appear significantly changed. -10/31/23 NM bone scan showing metastatic disease in the spine, pelvis and left lower rib are not significantly changed compared to the previous bone scan dated 6/28/2023. No new osseous lesions. 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   mCRPC - continue Eligard [id number] with Urology/Dr. Welsh  -started him on pluvicto the first dose on 12/13, will monitor him closely for supportive care with marrow suppression. recommend holding the 2nd dose given his deteriorating condition -continue xgeva today  - CT Chest and AP done in ED on 2/16/24: No lymphadenopathy.  Stable appearance of multiple sclerotic osseous metastases  most conspicuous in the T4, T9-T10, and L1 vertebral bodies, the left sacral ala, and the left posterior acetabulum.  - 2/26/24 MR [facility] spine: Lesion within the L3 spinous process and T5 vertebral body were not present on prior exam from 2023. - PSA has been uptrending, 4. 17 on 11/17/23 , 4.71 on 12/11/23, 3.79 on 1/5/24, 4.99 on 1/19/24, 5.84 on 2/16,  PSA continues to rise to 24.  CT AP with IVC from ED visit 4/14/24 found no acute abnormality.  CT chest 04/2024 showed small b/l pleural effusions but UMAIR. Bone scan done in [facility] showed continued  - Bone scan done in [facility] showed T3 to T6, T9, T10, L1, L4, sacrum, rib cage and left iliac bone. He is receiving Xgeva q4 weeks, and will receive a dose today. - Referral for Endocrinology (Dr. Rubio) for potential hypercalcemia management - Has been on abiraterone and dexamethasone (was recently switched from prednisone). 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 Could potentially increase dose if he tolerates. Pt and daughters are in agreement. his PSA is rapidly rising and MRI spine in July showed progression of disease. stopped olaparib.  -followed up with outside oncologist for further management -recommend marijuana oil for improving his appetite with palliative team   - Poor PS and leg weakness - will need physical therapy, recommend 1 hour daily for 5 days in one week - need a electric driven wheelchair for moving around - Discussion about Pluvicto physilogy and potential adverse effects and complications discussed with Dr. Marmolejo. - However, due to poor performance status, Pt/daughter encouraged to pursue Palliative care given his extensive disease.   Back pain - restarted now -no need to start pain meds now -s/p Dr. Ghotra for palliative RT eval  RTC in 4 weeks as needed

## 2024-12-27 NOTE — HISTORY OF PRESENT ILLNESS
[Disease: _____________________] : Disease: [unfilled] [T: ___] : T[unfilled] [N: ___] : N[unfilled] [M: ___] : M[unfilled] [AJCC Stage: ____] : AJCC Stage: [unfilled] [de-identified] : Fabian Balbuena is an 87 years old M with metastatic hormone sensitive prostate cancer.  He had the initial medical oncology consultation on 3/9/23: history of elevated PSA, 8.34 ng/mL on 5/23/2019. He had a PSA of 10.80 ng/mL on 9/14/2021. He was planned to have a prostate biopsy, however, due to pandemic, his biopsy was held.  PSA 1/23/23 99.4ng/ml 3/3/23: 196ng/ml  Bone scan 3/1/2023 -- diffuse foci of osseous metastasis with uptake in ribs, cervical, thoracic and lumbar spine, manubrium, sacrum, pelvis. Most prominently in thoracic spine.  Reports intermittent midback pain, 4/10, mild intermittent bilateral elbow pain and lower leg pain, normal appetite, 10lbs weight loss in 3 weeks. Urine flow is normal, no hesitancy, urgency, gross hematuria and burning. Nocturia 2 to 3. He was started on bicalutamide on March 13, 2023, eligard inj on Feb 17 with Dr. Welsh. He will continue to have bicalutamide in a total of one month. Patient declined prostate biopsy.  interval history 5/1/23 He and his daughter report that he finished Radiation therapy 3/24/23 with Dr. Ghotra. He finished bicalutamide around 3/2023. He went to [facility] 4/1/23 and had lab there showing PSA 0.64 on 4/18/23; he had bilateral hip persistent pain 6/10 which was alleviated with Tramadol and acetaminophen prn prescribed by physician at [facility]. He denied cough, chest pain, upper back pain, hot flashing, night sweats. Admits nocturia 4/night as his baseline but denied increased urgency/frequency and hematuria. Appetite is ok. He has dry stools after starting tramadol which has been improved with docusate. Admits walks 2000 steps/1.3 kilometer without difficulty.  6/26/23 reports right flank pain radiating to the mid abdomen, denies gross hematuria. Bilateral hip pain was resolved. Denies constipation.  8/14/23 Resolved right flank pain due to kidney stone which was released. Denies fatigue, hot flashes and sweating.  10/20/23 Denied bone pain, significant changes with urination, N/V/D, abd pain. Admits bilateral interphalangeal joints mild pain and stiffness especially in the morning. Outside lab by PCP on 10/12/23 PSA 2.23; T4 free 1.5 TSH 1.24 VitD -25 OH level 30 (normal range ) Cr 1.24. Denied SOB, chest pain on slow walking.   11/17/23 -10/31/23 CT chest/abd/p without IV contrast showing A few small low-density nodules measuring up to 1.5 cm are seen within the subcutaneous tissues of the right anterior lateral abdominal wall, thought likely related to sites of subcutaneous injection. Subcentimeter subpleural and helen fissural pulmonary nodules, without significant interval change or more ground glass in appearance on the current exam. Osseous metastatic disease again appreciated. New and/or more sclerotic lesions within the left proximal femur, the left acetabulum, the left hemisacrum, and several vertebral bodies and additional sclerotic lesions throughout the visualized osseous structures which do not appear significantly changed. -10/31/23 NM bone scan showing metastatic disease in the spine, pelvis and left lower rib are not significantly changed compared to the previous bone scan dated 6/28/2023. No new osseous lesions. He started abiraterone and prednisone on Oct 23, 2023 because of newly diagnosed mCRPC. He reports that he had intermittent lower abd "needle pinching pain" before urination at night about 2wks ago. Admits one formed bowel movement 3hr after the morning dose of medication. He normally has one to two formed bowel movments. Denied fatigue, hot flashes, N/V. Good appetite.  12/11/23 feels fine, denies fatigue, leg swelling and shortness of breath.   1/5/24: Appetite and energy are good. When it was warmer out he was walking 9-10K steps a day, now that it is colder he is walking 2-3K steps a day. He has mild chronic mid back pain, does not take anything for it. He notes some joint pain in a few fingers on b/l hands. Nocturia 4-5. Has a trip to [place] planned 3/16 - 4/4. Patient denies fever, cough, shortness of breath, chest pain, palpitations, abdominal pain, nausea/vomiting, diarrhea, constipation, dysuria, hematuria, itching, rashes, hot flashes.  1/19/24 He reports that his energy level comes back. Denies hot flashes, SOB, swelling on legs, N/V/D, changes with urination. /69 this morning.  He walks 7blocks and plays Widgetbox game with friends daily.   2/16/24: Patient has had chronic back pain, has h/o RT to spine about one year ago. Last month when patient was seen his pain was manageable, however in the last two weeks it has become much worse. The pain is located in his mid back, it is rated as an 8 out of 10. It is impacting his quality of life, he does not want to do the things he normally does. HHe is also having difficulty sleeping. Yesterday he took tylenol and pain decreased to a 2-3. The pain does not radiate. It is pulsing in nature. Does not have any shooting pain down legs, denies saddle anesthesia. This morning he took tylenol again but it did not help as much. Patient says that he has had weakness in his legs for several months now, however in recent weeks he thinks his weakness has increased. When he walks he needs to rest often and he staggers occ. He refuses to use a cane. Patient reports in the last two weeks he had had occ numbness in his hands and feet. He denies urinary and bowel incontinence. Does report urinary urgency.    Patient says that since being seen last month his appetite is diminished, says all of a sudden he just has not felt like eating. He is having acid reflux and gas. Patient is constipated and has "tinges of red" in stool. His stool is very hard, only drinks 4 cups of water per day.  Pt was seen in hospital on 2/16. CT CAP done with no acute path or change in disease. Spine was not imaged. 2/28/24 spoke with pt's daughter t's back pain has improved, rated as a 2 out of 10. Lidocaine patches really help him. Has not used oxycodone. MRIs show no evidence of spinal cord compression. Show new small lesion at L3. On 2/28 daughter reported that they do not wish to have bone scan at this point, pt is exhausted from getting MRs. Will be going to [place] on 3/14. Wants to have bone scan after he returns.  3/13/24: Patient was diagnosed with shingles a little less than one week ago, says the rash was on the L side of his face near his mouth and on his cheek. He was started on valtrex by his PCP. Then on 3/10 pt developed 9 out of 10 pain on his face/L cheek radiating to the top of his head, he went to the ED, he was given tylenol and prescribed gabapentin. The pain has improved now, it is a 3 out of 10, it is not constant, he gets quick jolts of pain every so often. He has been taking gabapentin. Per daughter he was also prescribed abx and inhaler in the ED on 3/11, because in the last week he has had a cough and some congestion. CXR 3/10 shows "clear lungs", discharge summary does not state that abx or inhaler were Rxed to pt. He has been feeling better, has not had a fever, congestion and cough are improving, he has not had difficulty breathing. No chest pain or palpitations. In general pt is constipated and normally has BM once a day, says he has loose stool three times a day presently because he is on abx. Denies blood in stool and dark/black stools. He has urinary dribbling and urgency but denies incontinence. Reports nocturia x3. He is sleeping and eating well, energy is okay. Has occ reflux, pt is unsure if he is taking omeprazole and/or simethicone. He has hot flashes every 3 - 4 nights. His back pain has improved, it is very mild, he is not taking oxycodone nor is he using lidocaine patch at present.   3/29/24 Whole body bone scan done in [facility] showed T3 to T6, T9, T10, L1, L4, sacrum, rib cage and left iliac bone.  4/15/24: Patient is accompanied by his daughters. He has developed numbness in the fingertips for the last 3 weeks, which has persisted. He recently returned from [facility] 3 days ago. He went to the [facility] ED yesterday due to vomiting and diarrhea and had T100.7 in triage. UA negative. CXR clear. Blood cultures x 2 were sent and pending. CT AP w/IVC found no acute findings.  He received IV antibiotics and was discharged home.  4/22/24: Multiple heterogenous mets, largest L1 vertebral body with addition mets in the L3 spinous process and L S1 segment/sacral ala without epidural extension. [de-identified] : prostate adenocarcinoma  [de-identified] : 5/20/24: Pt accompanied by his daughters. He has been having R infrascapular pain that is getting worse. He has been taking oxycodone 2.5 mg usually twice a day for the pain. He also notes some constipation.  6/5/24 reports constipation, mild- mod fatigue, severe lower back pain and acid reflux. He has normal appetite and eats well.  His /79, denies headache, weakness in arms and legs and blurry visions.     6/19/24 reports BP 170s/70,80 and 90s, similar upper back pain on percocet 5-325 1 tablets twice daily, hot flash and sweating, weakness in the legs. Vomiting once last night  7/17/24 MRI spine showed multiple osseous metastases along the spine and sacrum which have increased in both number and size compared to the April MRI. No evidence of epidural extension of disease or pathological fracture. No cord compression  7/22/24 reports bilateral leg weakness, right lower abdominal pain and lower back shingle pain. He is not able to walk in last 10 days and sits in the wheel chair. He has poor appetite and poor PS.   8/30/24 pt arrived with daughter etta in [place]. Ambulating with a walker. Appetite is fair and maintaining his weight. No diarrhea or constipation. Previously on Lanpanza (Olaparib) which was discontinued due to musculoskeletal weakness and shingles. Presently not on any therapy for mCRPC.   9/27/24 reports leg muscle weakness, and had a fall last night when he went to bathroom. Endorses new pain in bilateral flanks 2 to 4/10. States postneck pain.   11/29/24 reports generalized weakness, barely walks around. Endorses sometimes lower abdominal pain due to constipation. He was started on xtandi about one month and planning to be on pluvicto trial in [place].   12/27/24 received on dose of pluvicto on Dec 13, 2024. He feels more tired, loss of energy and appetite. He has skin friction on the sacral area. He started lower back pain, 4/10 just now.

## 2024-12-27 NOTE — PHYSICAL EXAM
[Capable of only limited self care, confined to bed or chair more than 50% of waking hours] : Status 3- Capable of only limited self care, confined to bed or chair more than 50% of waking hours [Normal] : affect appropriate [de-identified] : anicteric  [de-identified] : non tender, FROM  [de-identified] : no edema  [de-identified] : no spinal or paraspinal TTP [de-identified] : + decreased sensation in fingertips bilaterally, upper extremity strength intact 5/5 bilaterally

## 2024-12-27 NOTE — REVIEW OF SYSTEMS
[Fatigue] : fatigue [Diarrhea: Grade 0] : Diarrhea: Grade 0 [Constipation] : constipation [Incontinence] : incontinence [Muscle Weakness] : muscle weakness [Difficulty Walking] : difficulty walking [Hot Flashes] : hot flashes [Chills] : no chills [Chest Pain] : no chest pain [Palpitations] : no palpitations [Lower Ext Edema] : no lower extremity edema [Shortness Of Breath] : no shortness of breath [Cough] : no cough [Abdominal Pain] : no abdominal pain [Vomiting] : no vomiting [Dysuria] : no dysuria [Joint Pain] : no joint pain [Muscle Pain] : no muscle pain [Skin Rash] : no skin rash [FreeTextEntry2] : + fevers [FreeTextEntry7] : + diarrhea [de-identified] : + numbness in fingertips bilaterally

## 2025-02-06 NOTE — DATA REVIEWED
[FreeTextEntry1] : CT CAP (7/13/2024)  FINDINGS: CHEST: LUNGS AND LARGE AIRWAYS: Patent central airways. Unchanged appearance of right apical scarring with associated calcification. Left upper lobe calcified granuloma. Nodule measuring 2 mm within the right lower lobe (series 4 image 146), unchanged since at least 3/14/2023. There are a few lentiform perifissural nodules along the horizontal fissure in the right lung measuring up to 5 mm (for example series 4 image 126), which are similar to the exam of 4/21/2024 and decreased in size since 3/14/2023, and may reflect intrapulmonary lymph nodes. Bilateral subsegmental atelectasis. PLEURA: Trace bilateral pleural effusions. VESSELS: Atherosclerotic changes. Coronary artery calcifications and possibly stenting. HEART: Heart size is normal. Aortic valve and mitral annular calcification. No pericardial effusion. MEDIASTINUM AND PENNY: Calcified AP window lymph node. No enlarged noncalcified lymph nodes. CHEST WALL AND LOWER NECK: Within normal limits.  ABDOMEN AND PELVIS: LIVER: Within normal limits. BILE DUCTS: Normal caliber. GALLBLADDER: Within normal limits. SPLEEN: Within normal limits. PANCREAS: Within normal limits. ADRENALS: Within normal limits. KIDNEYS/URETERS: No hydronephrosis. Left renal cyst and bilateral subcentimeter hypodense foci that are too small to characterize.  BLADDER: A few small coarse calcifications along the anterior bladder dome are unchanged since at least 3/14/2023. REPRODUCTIVE ORGANS: The prostate is normal in size and contains coarse calcification.  BOWEL: No bowel obstruction. Periampullary duodenal diverticulum. Appendix is normal. PERITONEUM/RETROPERITONEUM: Within normal limits. VESSELS: Atherosclerotic changes. LYMPH NODES: No lymphadenopathy. ABDOMINAL WALL: Small fat-containing left inguinal hernia. A few small subcutaneous nodular foci in the right anterior abdominal wall are similar to the previous exam, with largest example measuring 1.2 x 0.6 cm (series 2 image 124), possibly injection granulomas. BONES: Scattered sclerotic osseous metastases are overall similar to the prior chest CT of 4/21/2024 and abdominopelvic CT of 4/14/2024.  IMPRESSION: Since April 2024: *  Similar appearance of osseous metastases. *  A few pulmonary nodules measuring up to 5 mm in the right lung are unchanged. *  A few small subcutaneous nodular foci within the right anterior abdominal wall are unchanged, possibly injection granulomas.

## 2025-02-06 NOTE — ASSESSMENT
[FreeTextEntry1] : 88yoM with:   # Stage IV Prostate Cancer - On Eligard, Xgeva. Med Onc follow up.   # Pain 2/2 Neoplasm - improved s/p palliative RT  # b/l LE edema - pt on furosemide 20mg and will be initiating spironolactone 12.5mg. Advise patient stop amlodipine 2.5mg as his BP has been low and it may be contributing to the edema.     # Peripheral neuropathy - ?2/2 diabetes c/w gabapentin 300mg BID   #  Encounter for palliative care- emotional support provided.  Follow up in one month, call sooner with questions or issues.

## 2025-02-06 NOTE — HISTORY OF PRESENT ILLNESS
[FreeTextEntry1] : 88yoM with prostate cancer presents for follow up palliative care visit, referred by Dr. Marmolejo.  PMH significant for DM, GERD, HLD,   Pt had history of elevated PSA, 8.34 ng/mL on 5/23/201, johnathan to 10.80 ng/mL on 9/14/2021. Bone scan 3/1/2023 -- diffuse foci of osseous metastasis with uptake in ribs, cervical, thoracic and lumbar spine, manubrium, sacrum, pelvis. Most prominently in thoracic spine.  Reports intermittent midback pain, 4/10, mild intermittent bilateral elbow pain and lower leg pain, normal appetite, 10lbs weight loss in 3 weeks. Urine flow is normal, no hesitancy, urgency, gross hematuria and burning. Nocturia 2 to 3. He was started on bicalutamide on March 13, 2023, eligard inj on Feb 17 with Dr. Welsh. He will continue to have bicalutamide in a total of one month. Patient declined prostate biopsy.   Main reason for which patient is referred today is pain. He is accompanied by his daughter, Millicent.  He was previously experiencing pain in his mid-back. Received 5 fractions of RT to T spine two weeks ago, this helped his pain.  Patient reports recent onset of numbness and weakness that came on about 3 days ago. He correlates this to around the time he received IV contrast for imaging.  His daughter states that the symptoms preceded that. Additionally he has pain in his thighs for which he is wearing salonpas patches. In particular, his LLE is experiencing hyperesthesia.   Gabapentin 100mg was recently prescribed by his PCP for shingles of the flank. He is using it on PRN basis.  Had been using Oxycodone/Acetaminophen 5/325 for his back pain but since the pain improved with RT he is no longer using.   Of note, he was ordered for MRI L-Spine by physiatrist Dr. Bone, it was performed at [facility] yesterday. Results pending. They are planning on initiating 3x weekly outpatient PT.    Interval Hx (2/6/25): Patient presents for follow up visit, he is accompanied by his daughter, Millicent. Last visit with our team was 7/2024. He receive Pluvicto x 1 on 12/12/24 under Dr. Indra Montiel at [facility].  His performance status has been poor and he has not resumed.   Met with cardiology yesterday, had a TTE   ROS: +b/l LE edema - his PCP started him on furosemide 20mg last week +nocturia +wears adult diaper for urinary incontinence +appetite is so-so Denies nausea, bowel issues,   He is using a rolling walker for assistance with ambulation. He has had 3 falls this week. He has HHA on weekends - 7 hours/day Sat and Sun.  On weekdays he goes to the [facility] during the day.   Patient is , lives with his wife in an apartment, he recently relocated from a private house due to his mobility issues.  Has not been driving lately.   PCP: Dr. Awan Physiatrist: Dr. Silvio Bone ([phone]) Urologist: Dr. Dorian Welsh   I-STOP Ref#: [id number]

## 2025-02-06 NOTE — HISTORY OF PRESENT ILLNESS
[FreeTextEntry1] : 88yoM with prostate cancer presents for follow up palliative care visit, referred by Dr. Marmolejo.  PMH significant for DM, GERD, HLD,   Pt had history of elevated PSA, 8.34 ng/mL on 5/23/201, johnathan to 10.80 ng/mL on 9/14/2021. Bone scan 3/1/2023 -- diffuse foci of osseous metastasis with uptake in ribs, cervical, thoracic and lumbar spine, manubrium, sacrum, pelvis. Most prominently in thoracic spine.  Reports intermittent midback pain, 4/10, mild intermittent bilateral elbow pain and lower leg pain, normal appetite, 10lbs weight loss in 3 weeks. Urine flow is normal, no hesitancy, urgency, gross hematuria and burning. Nocturia 2 to 3. He was started on bicalutamide on March 13, 2023, eligard inj on Feb 17 with Dr. Welsh. He will continue to have bicalutamide in a total of one month. Patient declined prostate biopsy.   Main reason for which patient is referred today is pain. He is accompanied by his daughter, Millicent.  He was previously experiencing pain in his mid-back. Received 5 fractions of RT to T spine two weeks ago, this helped his pain.  Patient reports recent onset of numbness and weakness that came on about 3 days ago. He correlates this to around the time he received IV contrast for imaging.  His daughter states that the symptoms preceded that. Additionally he has pain in his thighs for which he is wearing salonpas patches. In particular, his LLE is experiencing hyperesthesia.   Gabapentin 100mg was recently prescribed by his PCP for shingles of the flank. He is using it on PRN basis.  Had been using Oxycodone/Acetaminophen 5/325 for his back pain but since the pain improved with RT he is no longer using.   Of note, he was ordered for MRI L-Spine by physiatrist Dr. Bone, it was performed at [facility] yesterday. Results pending. They are planning on initiating 3x weekly outpatient PT.    Interval Hx (2/6/25): Patient presents for follow up visit, he is accompanied by his daughter, Millicent. Last visit with our team was 7/2024. He receive Pluvicto x 1 on 12/12/24 under Dr. Indra Montiel at [facility].  His performance status has been poor and he has not resumed.   Met with cardiology yesterday, had a TTE   ROS: +b/l LE edema - his PCP started him on furosemide 20mg last week +nocturia +wears adult diaper for urinary incontinence +appetite is so-so Denies nausea, bowel issues,   He is using a rolling walker for assistance with ambulation. He has had 3 falls this week. He has HHA on weekends - 7 hours/day Sat and Sun.  On weekdays he goes to the [facility] during the day.   Patient is , lives with his wife in an apartment, he recently relocated from a private house due to his mobility issues.  Has not been driving lately.   PCP: Dr. Awan Physiatrist: Dr. Silvio Bone ([phone]) Urologist: Dr. Dorian Welsh   I-STOP Ref#: [id number]

## 2025-02-06 NOTE — HISTORY OF PRESENT ILLNESS
[FreeTextEntry1] : 88yoM with prostate cancer presents for follow up palliative care visit, referred by Dr. Marmolejo.  PMH significant for DM, GERD, HLD,   Pt had history of elevated PSA, 8.34 ng/mL on 5/23/201, johnathan to 10.80 ng/mL on 9/14/2021. Bone scan 3/1/2023 -- diffuse foci of osseous metastasis with uptake in ribs, cervical, thoracic and lumbar spine, manubrium, sacrum, pelvis. Most prominently in thoracic spine.  Reports intermittent midback pain, 4/10, mild intermittent bilateral elbow pain and lower leg pain, normal appetite, 10lbs weight loss in 3 weeks. Urine flow is normal, no hesitancy, urgency, gross hematuria and burning. Nocturia 2 to 3. He was started on bicalutamide on March 13, 2023, eligard inj on Feb 17 with Dr. Welsh. He will continue to have bicalutamide in a total of one month. Patient declined prostate biopsy.   Main reason for which patient is referred today is pain. He is accompanied by his daughter, Millicent.  He was previously experiencing pain in his mid-back. Received 5 fractions of RT to T spine two weeks ago, this helped his pain.  Patient reports recent onset of numbness and weakness that came on about 3 days ago. He correlates this to around the time he received IV contrast for imaging.  His daughter states that the symptoms preceded that. Additionally he has pain in his thighs for which he is wearing salonpas patches. In particular, his LLE is experiencing hyperesthesia.   Gabapentin 100mg was recently prescribed by his PCP for shingles of the flank. He is using it on PRN basis.  Had been using Oxycodone/Acetaminophen 5/325 for his back pain but since the pain improved with RT he is no longer using.   Of note, he was ordered for MRI L-Spine by physiatrist Dr. Bone, it was performed at [facility] yesterday. Results pending. They are planning on initiating 3x weekly outpatient PT.    Interval Hx (2/6/25): Patient presents for follow up visit, he is accompanied by his daughter, Millicent. Last visit with our team was 7/2024. He receive Pluvicto x 1 on 12/12/24 under Dr. Indra Montiel at [facility].  His performance status has been poor and he has not resumed.   Met with cardiology yesterday, had a TTE   ROS: +b/l LE edema - his PCP started him on furosemide 20mg last week +nocturia +wears adult diaper for urinary incontinence +appetite is so-so Denies nausea, bowel issues,   He is using a rolling walker for assistance with ambulation. He has had 3 falls this week. He has HHA on weekends - 7 hours/day Sat and Sun.  On weekdays he goes to the [facility] during the day.   Patient is , lives with his wife in an apartment, he recently relocated from a private house due to his mobility issues.  Has not been driving lately.   PCP: Dr. Awan Physiatrist: Dr. Silvio Bone ([phone]) Urologist: Dr. Dorian Welsh   I-STOP Ref#: [id number]

## 2025-02-06 NOTE — PHYSICAL EXAM
[General Appearance - Alert] : alert [General Appearance - In No Acute Distress] : in no acute distress [Sclera] : the sclera and conjunctiva were normal [Normal Oral Mucosa] : normal oral mucosa [Neck Appearance] : the appearance of the neck was normal [] : no respiratory distress [Auscultation Breath Sounds / Voice Sounds] : lungs were clear to auscultation bilaterally [Heart Rate And Rhythm] : heart rate was normal and rhythm regular [Heart Sounds] : normal S1 and S2 [Edema] : there was no peripheral edema [Bowel Sounds] : normal bowel sounds [Abdomen Soft] : soft [Abdomen Tenderness] : non-tender [Motor Tone] : muscle strength and tone were normal [Skin Color & Pigmentation] : normal skin color and pigmentation [No Focal Deficits] : no focal deficits [Oriented To Time, Place, And Person] : oriented to person, place, and time [Affect] : the affect was normal [FreeTextEntry1] : elderly male, sitting in wheelchair

## 2025-02-18 NOTE — REASON FOR VISIT
[Home] : at home, [unfilled] , at the time of the visit. [Medical Office: ([facility])___] : at the medical office located in  [Telehealth (audio & video)] : This visit was provided via telehealth using real-time 2-way audio visual technology. [Follow-Up] : a follow-up visit [Other: _____] : [unfilled] [Verbal consent obtained from patient] : the patient, [unfilled]

## 2025-02-18 NOTE — REASON FOR VISIT
[Home] : at home, [unfilled] , at the time of the visit. [Medical Office: ([facility])___] : at the medical office located in  [Telehealth (audio & video)] : This visit was provided via telehealth using real-time 2-way audio visual technology. [Follow-Up] : a follow-up visit [Other: _____] : [unfilled] [Verbal consent obtained from patient] : the patient, [unfilled]

## 2025-02-19 NOTE — HISTORY OF PRESENT ILLNESS
[FreeTextEntry1] : 89yoM with prostate cancer presents for follow up palliative care visit, referred by Dr. Marmolejo.  PMH significant for DM, GERD, HLD,   Pt had history of elevated PSA, 8.34 ng/mL on 5/23/201, johnathan to 10.80 ng/mL on 9/14/2021. Bone scan 3/1/2023 -- diffuse foci of osseous metastasis with uptake in ribs, cervical, thoracic and lumbar spine, manubrium, sacrum, pelvis. Most prominently in thoracic spine.  Reports intermittent midback pain, 4/10, mild intermittent bilateral elbow pain and lower leg pain, normal appetite, 10lbs weight loss in 3 weeks. Urine flow is normal, no hesitancy, urgency, gross hematuria and burning. Nocturia 2 to 3. He was started on bicalutamide on March 13, 2023, eligard inj on Feb 17 with Dr. Welsh. He will continue to have bicalutamide in a total of one month. Patient declined prostate biopsy.   Main reason for which patient is referred today is pain. He is accompanied by his daughter, Millicent.  He was previously experiencing pain in his mid-back. Received 5 fractions of RT to T spine two weeks ago, this helped his pain.  Patient reports recent onset of numbness and weakness that came on about 3 days ago. He correlates this to around the time he received IV contrast for imaging.  His daughter states that the symptoms preceded that. Additionally he has pain in his thighs for which he is wearing salonpas patches. In particular, his LLE is experiencing hyperesthesia.   Gabapentin 100mg was recently prescribed by his PCP for shingles of the flank. He is using it on PRN basis.  Had been using Oxycodone/Acetaminophen 5/325 for his back pain but since the pain improved with RT he is no longer using.   Of note, he was ordered for MRI L-Spine by physiatrist Dr. Bone, it was performed at [facility] yesterday. Results pending. They are planning on initiating 3x weekly outpatient PT.   Met with cardiology recently, had a TTE performed, showed normal LVEF 60-65%, normal RV size/function, aortic sclerosis and mild TR.  He has elevated proBNP and is on Farxiga 10mg QD.    Interval Hx (2/19/25): Patient seen via telemedicine for palliative medicine follow up. He is accompanied by his daughter, Millicent. He received Pluvicto x 1 on 12/12/24 under Dr. Indra Montiel at [facility].  Did not tolerate well. Millicent reports that patient has demonstrated a physical decline over the last couple of weeks since our last visit.  He is eating less and is weaker.  On 2/15 he had an episode of emesis.  Has not had any fever or chills, no dysuria. He is more fatigued, taking two naps daily up to 2 hours each whereas he was not napping previously.  He has been experiencing pain in his lower abdomen/ pelvis for the past couple of weeks, described as a throbbing sensation. The pain is not impacted by moving bowels or by food intake. He was previously using gabapentin for peripheral neuropathy and had stopped it. Millicent started him back on it at a dose of 300mg BID although unsure if it does much. Millicent gives him tylenol 650mg PRN which seems to help for a few hours.   On 2/7 the furosemide was stopped. He never started the spironolactone.  On 2/16 he stopped the amlodipine.  BPs are checked daily ranging from 120s - 140s systolic over 55-95 diastolic.   ROS: +nocturia +wears adult diaper for urinary incontinence +appetite is low Denies bowel issues,   He is using a rolling walker for assistance with ambulation.  He has HHA on weekends - 7 hours/day Sat and Sun.  On weekdays he goes to the [employer] during the day.   Patient is , lives with his wife in an apartment, he recently relocated from a private house due to his mobility issues.  Has not been driving lately.   PCP: Dr. Awan Physiatrist: Dr. Silvio Bone ([phone]) Urologist: Dr. Dorian Welsh   I-STOP Ref#: [id number]

## 2025-02-19 NOTE — HISTORY OF PRESENT ILLNESS
[FreeTextEntry1] : 89yoM with prostate cancer presents for follow up palliative care visit, referred by Dr. Marmolejo.  PMH significant for DM, GERD, HLD,   Pt had history of elevated PSA, 8.34 ng/mL on 5/23/201, johnathan to 10.80 ng/mL on 9/14/2021. Bone scan 3/1/2023 -- diffuse foci of osseous metastasis with uptake in ribs, cervical, thoracic and lumbar spine, manubrium, sacrum, pelvis. Most prominently in thoracic spine.  Reports intermittent midback pain, 4/10, mild intermittent bilateral elbow pain and lower leg pain, normal appetite, 10lbs weight loss in 3 weeks. Urine flow is normal, no hesitancy, urgency, gross hematuria and burning. Nocturia 2 to 3. He was started on bicalutamide on March 13, 2023, eligard inj on Feb 17 with Dr. Welsh. He will continue to have bicalutamide in a total of one month. Patient declined prostate biopsy.   Main reason for which patient is referred today is pain. He is accompanied by his daughter, Millicent.  He was previously experiencing pain in his mid-back. Received 5 fractions of RT to T spine two weeks ago, this helped his pain.  Patient reports recent onset of numbness and weakness that came on about 3 days ago. He correlates this to around the time he received IV contrast for imaging.  His daughter states that the symptoms preceded that. Additionally he has pain in his thighs for which he is wearing salonpas patches. In particular, his LLE is experiencing hyperesthesia.   Gabapentin 100mg was recently prescribed by his PCP for shingles of the flank. He is using it on PRN basis.  Had been using Oxycodone/Acetaminophen 5/325 for his back pain but since the pain improved with RT he is no longer using.   Of note, he was ordered for MRI L-Spine by physiatrist Dr. Bone, it was performed at [facility] yesterday. Results pending. They are planning on initiating 3x weekly outpatient PT.   Met with cardiology recently, had a TTE performed, showed normal LVEF 60-65%, normal RV size/function, aortic sclerosis and mild TR.  He has elevated proBNP and is on Farxiga 10mg QD.    Interval Hx (2/19/25): Patient seen via telemedicine for palliative medicine follow up. He is accompanied by his daughter, Millicent. He received Pluvicto x 1 on 12/12/24 under Dr. Indra Montiel at [facility].  Did not tolerate well. Millicent reports that patient has demonstrated a physical decline over the last couple of weeks since our last visit.  He is eating less and is weaker.  On 2/15 he had an episode of emesis.  Has not had any fever or chills, no dysuria. He is more fatigued, taking two naps daily up to 2 hours each whereas he was not napping previously.  He has been experiencing pain in his lower abdomen/ pelvis for the past couple of weeks, described as a throbbing sensation. The pain is not impacted by moving bowels or by food intake. He was previously using gabapentin for peripheral neuropathy and had stopped it. Millicent started him back on it at a dose of 300mg BID although unsure if it does much. Millicent gives him tylenol 650mg PRN which seems to help for a few hours.   On 2/7 the furosemide was stopped. He never started the spironolactone.  On 2/16 he stopped the amlodipine.  BPs are checked daily ranging from 120s - 140s systolic over 55-95 diastolic.   ROS: +nocturia +wears adult diaper for urinary incontinence +appetite is low Denies bowel issues,   He is using a rolling walker for assistance with ambulation.  He has HHA on weekends - 7 hours/day Sat and Sun.  On weekdays he goes to the [employer] during the day.   Patient is , lives with his wife in an apartment, he recently relocated from a private house due to his mobility issues.  Has not been driving lately.   PCP: Dr. Awan Physiatrist: Dr. Silvio Bone ([phone]) Urologist: Dr. Dorian Welsh   I-STOP Ref#: [id number]

## 2025-02-19 NOTE — ASSESSMENT
[______] : HCP: [unfilled] [FreeTextEntry1] : 89yoM with:   # Stage IV Prostate Cancer - On Eligard, Xgeva. Med Onc follow up.   # Abdominopelvic pain - ? neoplasm related - uncertain etiology - recommend continuing tylenol at a dose of 650mg and using this up to every 6 hours PRN.  would hold gabapentin at this time -Check CT to evaluate for disease progression and elucidate for potential cause of pain. Of note, patient attributed his symptoms of LE weakness and numbness to IV contrast he received for his last CT scan and will not agree to receiving IV contrast again.   # Fatigue - Ddx includes: anemia, advancing malignancy, infection, medication. Recommend stopping the gabapentin as it does not seem to be helping his acute pain and may be contributing to his fatigue. Will check blood work.  # b/l LE edema -  Advise patient stop amlodipine 2.5mg as his BP has been low and it may be contributing to the edema.   #  Encounter for palliative care- emotional support provided. HCP form on file.   Follow up next week, call sooner with questions or issues.

## 2025-02-19 NOTE — PHYSICAL EXAM
[General Appearance - Alert] : alert [General Appearance - In No Acute Distress] : in no acute distress [Oriented To Time, Place, And Person] : oriented to person, place, and time [Affect] : the affect was normal [FreeTextEntry1] : elderly male

## 2025-02-26 NOTE — REASON FOR VISIT
[Home] : at home, [unfilled] , at the time of the visit. [Medical Office: ([facility])___] : at the medical office located in  [Telehealth (audio & video)] : This visit was provided via telehealth using real-time 2-way audio visual technology. [Verbal consent obtained from patient] : the patient, [unfilled] [Follow-Up] : a follow-up visit [Other: _____] : [unfilled]

## 2025-02-26 NOTE — REASON FOR VISIT
[Home] : at home, [unfilled] , at the time of the visit. [Medical Office: ([facility])___] : at the medical office located in  [Telehealth (audio & video)] : This visit was provided via telehealth using real-time 2-way audio visual technology. [Verbal consent obtained from patient] : the patient, [unfilled] [Follow-Up] : a follow-up visit [Other: _____] : [unfilled]

## 2025-02-26 NOTE — REASON FOR VISIT
[Home] : at home, [unfilled] , at the time of the visit. [Medical Office: ([facility])___] : at the medical office located in  [Telehealth (audio & video)] : This visit was provided via telehealth using real-time 2-way audio visual technology. [Verbal consent obtained from patient] : the patient, [unfilled] [Follow-Up] : a follow-up visit [Other: _____] : [unfilled]

## 2025-03-04 NOTE — HISTORY OF PRESENT ILLNESS
[FreeTextEntry1] : 89yoM with prostate cancer presents for follow up palliative care visit, referred by Dr. Marmolejo.  PMH significant for DM, GERD, HLD,   Pt had history of elevated PSA, 8.34 ng/mL on 5/23/201, johnathan to 10.80 ng/mL on 9/14/2021. Bone scan 3/1/2023 -- diffuse foci of osseous metastasis with uptake in ribs, cervical, thoracic and lumbar spine, manubrium, sacrum, pelvis. Most prominently in thoracic spine.  Reports intermittent midback pain, 4/10, mild intermittent bilateral elbow pain and lower leg pain, normal appetite, 10lbs weight loss in 3 weeks. Urine flow is normal, no hesitancy, urgency, gross hematuria and burning. Nocturia 2 to 3. He was started on bicalutamide on March 13, 2023, eligard inj on Feb 17 with Dr. Welsh. He will continue to have bicalutamide in a total of one month. Patient declined prostate biopsy.   Main reason for which patient is referred today is pain. He is accompanied by his daughter, Millicent.  He was previously experiencing pain in his mid-back. Received 5 fractions of RT to T spine two weeks ago, this helped his pain.  Patient reports recent onset of numbness and weakness that came on about 3 days ago. He correlates this to around the time he received IV contrast for imaging.  His daughter states that the symptoms preceded that. Additionally he has pain in his thighs for which he is wearing salonpas patches. In particular, his LLE is experiencing hyperesthesia.   Gabapentin 100mg was recently prescribed by his PCP for shingles of the flank. He is using it on PRN basis.  Had been using Oxycodone/Acetaminophen 5/325 for his back pain but since the pain improved with RT he is no longer using.   Of note, he was ordered for MRI L-Spine by physiatrist Dr. Bone, it was performed at [facility] yesterday. Results pending. They are planning on initiating 3x weekly outpatient PT.   Met with cardiology recently, had a TTE performed, showed normal LVEF 60-65%, normal RV size/function, aortic sclerosis and mild TR.  He has elevated proBNP and is on Farxiga 10mg QD.  On 2/7 the furosemide was stopped. He never started the spironolactone.  On 2/16 he stopped the amlodipine.  BPs are checked daily ranging from 120s - 140s systolic over 55-95 diastolic.   Interval Hx (2/27/25): Patient seen via telemedicine for palliative medicine follow up. He is accompanied by his daughter, Millicent. He received Pluvicto x 1 on 12/12/24 under Dr. Indra Montiel at [facility].  Did not tolerate well. Millicent reports that patient has had continued physical decline.  He is eating less and is weaker. He is more fatigued, taking two naps daily up to 2 hours each whereas he was not napping previously.  He has been experiencing pain in his lower abdomen/ pelvis for the past couple of weeks, described as a throbbing sensation. Millicent took him off of gabapentin as we had discussed at last visit.  He was off of it for 1 week.  Millicent noted that pt seemed to have more pain and she was giving him Tylenol, and she put him back on the gabapentin 300mg TID.  She does not feel that he is more sedated since being back on the gabapentin but rather that the increased sedation preceded the reintroduction of gabapentin. She gives him tylenol 650mg PRN which seems to help for a few hours.   He has been weaker and his legs are sometimes buckling beneath him. Millicent finds that he is swaying sometimes.  ROS: +wears adult diaper for urinary incontinence - has been incontinent more frequently lately. Millicent notes that his urine has some sediment in it +appetite is low Denies bowel issues,   He is using a rolling walker for assistance with ambulation.  He has HHA on weekends - 7 hours/day Sat and Sun.  On weekdays he goes to the [employer] during the day.   Patient is , lives with his wife in an apartment, he recently relocated from a private house due to his mobility issues.   PCP: Dr. Awan Physiatrist: Dr. Silvio Bone ([phone]) Urologist: Dr. Dorian Welsh   I-STOP Ref#: [id number]

## 2025-03-04 NOTE — ASSESSMENT
[______] : HCP: [unfilled] [FreeTextEntry1] : 89yoM with:   # Stage IV Prostate Cancer - On Eligard, Xgeva. Med Onc follow up.  Recent CT imaging redemonstrates widespread sclerotic metastases.   # Abdominopelvic pain - ? neoplasm related - uncertain etiology - recommend continuing tylenol at a dose of 650mg and using this up to every 6 hours PRN.  pt's daughter put him back on gabapentin 300mg TID, explained that this could be a contributor to his worsened fatigue however she seems to be doubtful of this.   # Fatigue - Ddx includes: anemia, advancing malignancy, infection, medication. blood work and urine study did not identify any plausible cause for patient's fatigue.  Discussed how he may be exhibiting signs of advancing malignancy.   # b/l LE edema -  Advise patient stop amlodipine 2.5mg as his BP has been low and it may be contributing to the edema.   #  Encounter for palliative care- emotional support provided. HCP form on file.  No MOLST on file, need to discuss at follow up.   Follow up 2 weeks, call sooner with questions or issues.

## 2025-03-04 NOTE — HISTORY OF PRESENT ILLNESS
[FreeTextEntry1] : 89yoM with prostate cancer presents for follow up palliative care visit, referred by Dr. Marmolejo.  PMH significant for DM, GERD, HLD,   Pt had history of elevated PSA, 8.34 ng/mL on 5/23/201, johnathan to 10.80 ng/mL on 9/14/2021. Bone scan 3/1/2023 -- diffuse foci of osseous metastasis with uptake in ribs, cervical, thoracic and lumbar spine, manubrium, sacrum, pelvis. Most prominently in thoracic spine.  Reports intermittent midback pain, 4/10, mild intermittent bilateral elbow pain and lower leg pain, normal appetite, 10lbs weight loss in 3 weeks. Urine flow is normal, no hesitancy, urgency, gross hematuria and burning. Nocturia 2 to 3. He was started on bicalutamide on March 13, 2023, eligard inj on Feb 17 with Dr. Welsh. He will continue to have bicalutamide in a total of one month. Patient declined prostate biopsy.   Main reason for which patient is referred today is pain. He is accompanied by his daughter, Millicent.  He was previously experiencing pain in his mid-back. Received 5 fractions of RT to T spine two weeks ago, this helped his pain.  Patient reports recent onset of numbness and weakness that came on about 3 days ago. He correlates this to around the time he received IV contrast for imaging.  His daughter states that the symptoms preceded that. Additionally he has pain in his thighs for which he is wearing salonpas patches. In particular, his LLE is experiencing hyperesthesia.   Gabapentin 100mg was recently prescribed by his PCP for shingles of the flank. He is using it on PRN basis.  Had been using Oxycodone/Acetaminophen 5/325 for his back pain but since the pain improved with RT he is no longer using.   Of note, he was ordered for MRI L-Spine by physiatrist Dr. Bone, it was performed at [facility] yesterday. Results pending. They are planning on initiating 3x weekly outpatient PT.   Met with cardiology recently, had a TTE performed, showed normal LVEF 60-65%, normal RV size/function, aortic sclerosis and mild TR.  He has elevated proBNP and is on Farxiga 10mg QD.  On 2/7 the furosemide was stopped. He never started the spironolactone.  On 2/16 he stopped the amlodipine.  BPs are checked daily ranging from 120s - 140s systolic over 55-95 diastolic.   Interval Hx (2/27/25): Patient seen via telemedicine for palliative medicine follow up. He is accompanied by his daughter, Millicent. He received Pluvicto x 1 on 12/12/24 under Dr. Indra Montiel at [facility].  Did not tolerate well. Millicent reports that patient has had continued physical decline.  He is eating less and is weaker. He is more fatigued, taking two naps daily up to 2 hours each whereas he was not napping previously.  He has been experiencing pain in his lower abdomen/ pelvis for the past couple of weeks, described as a throbbing sensation. Millicent took him off of gabapentin as we had discussed at last visit.  He was off of it for 1 week.  Millicent noted that pt seemed to have more pain and she was giving him Tylenol, and she put him back on the gabapentin 300mg TID.  She does not feel that he is more sedated since being back on the gabapentin but rather that the increased sedation preceded the reintroduction of gabapentin. She gives him tylenol 650mg PRN which seems to help for a few hours.   He has been weaker and his legs are sometimes buckling beneath him. Millicent finds that he is swaying sometimes.  ROS: +wears adult diaper for urinary incontinence - has been incontinent more frequently lately. Millicent notes that his urine has some sediment in it +appetite is low Denies bowel issues,   He is using a rolling walker for assistance with ambulation.  He has HHA on weekends - 7 hours/day Sat and Sun.  On weekdays he goes to the [employer] during the day.   Patient is , lives with his wife in an apartment, he recently relocated from a private house due to his mobility issues.   PCP: Dr. Awan Physiatrist: Dr. Silvio Bone ([phone]) Urologist: Dr. Dorian Welsh   I-STOP Ref#: [id number]

## 2025-03-04 NOTE — HISTORY OF PRESENT ILLNESS
[FreeTextEntry1] : 89yoM with prostate cancer presents for follow up palliative care visit, referred by Dr. Marmolejo.  PMH significant for DM, GERD, HLD,   Pt had history of elevated PSA, 8.34 ng/mL on 5/23/201, johnathan to 10.80 ng/mL on 9/14/2021. Bone scan 3/1/2023 -- diffuse foci of osseous metastasis with uptake in ribs, cervical, thoracic and lumbar spine, manubrium, sacrum, pelvis. Most prominently in thoracic spine.  Reports intermittent midback pain, 4/10, mild intermittent bilateral elbow pain and lower leg pain, normal appetite, 10lbs weight loss in 3 weeks. Urine flow is normal, no hesitancy, urgency, gross hematuria and burning. Nocturia 2 to 3. He was started on bicalutamide on March 13, 2023, eligard inj on Feb 17 with Dr. Welsh. He will continue to have bicalutamide in a total of one month. Patient declined prostate biopsy.   Main reason for which patient is referred today is pain. He is accompanied by his daughter, Millicent.  He was previously experiencing pain in his mid-back. Received 5 fractions of RT to T spine two weeks ago, this helped his pain.  Patient reports recent onset of numbness and weakness that came on about 3 days ago. He correlates this to around the time he received IV contrast for imaging.  His daughter states that the symptoms preceded that. Additionally he has pain in his thighs for which he is wearing salonpas patches. In particular, his LLE is experiencing hyperesthesia.   Gabapentin 100mg was recently prescribed by his PCP for shingles of the flank. He is using it on PRN basis.  Had been using Oxycodone/Acetaminophen 5/325 for his back pain but since the pain improved with RT he is no longer using.   Of note, he was ordered for MRI L-Spine by physiatrist Dr. Bone, it was performed at [facility] yesterday. Results pending. They are planning on initiating 3x weekly outpatient PT.   Met with cardiology recently, had a TTE performed, showed normal LVEF 60-65%, normal RV size/function, aortic sclerosis and mild TR.  He has elevated proBNP and is on Farxiga 10mg QD.  On 2/7 the furosemide was stopped. He never started the spironolactone.  On 2/16 he stopped the amlodipine.  BPs are checked daily ranging from 120s - 140s systolic over 55-95 diastolic.   Interval Hx (2/27/25): Patient seen via telemedicine for palliative medicine follow up. He is accompanied by his daughter, Millicent. He received Pluvicto x 1 on 12/12/24 under Dr. Indra Montiel at [facility].  Did not tolerate well. Millicent reports that patient has had continued physical decline.  He is eating less and is weaker. He is more fatigued, taking two naps daily up to 2 hours each whereas he was not napping previously.  He has been experiencing pain in his lower abdomen/ pelvis for the past couple of weeks, described as a throbbing sensation. Millicent took him off of gabapentin as we had discussed at last visit.  He was off of it for 1 week.  Millicent noted that pt seemed to have more pain and she was giving him Tylenol, and she put him back on the gabapentin 300mg TID.  She does not feel that he is more sedated since being back on the gabapentin but rather that the increased sedation preceded the reintroduction of gabapentin. She gives him tylenol 650mg PRN which seems to help for a few hours.   He has been weaker and his legs are sometimes buckling beneath him. Millicent finds that he is swaying sometimes.  ROS: +wears adult diaper for urinary incontinence - has been incontinent more frequently lately. Millicent notes that his urine has some sediment in it +appetite is low Denies bowel issues,   He is using a rolling walker for assistance with ambulation.  He has HHA on weekends - 7 hours/day Sat and Sun.  On weekdays he goes to the [employer] during the day.   Patient is , lives with his wife in an apartment, he recently relocated from a private house due to his mobility issues.   PCP: Dr. Awan Physiatrist: Dr. Silvio Bone ([phone]) Urologist: Dr. Dorian Welsh   I-STOP Ref#: [id number]

## 2025-03-10 NOTE — DATA REVIEWED
[FreeTextEntry1] : CT C/A/P  (02/25/2025):   COMPARISON: 7.26.24.  FINDINGS: CHEST: LUNGS AND LARGE AIRWAYS: Patent central airways. No pulmonary nodules. PLEURA: Small pleural effusions. VESSELS: Within normal limits. HEART: Heart size is normal.  No pericardial effusion. MEDIASTINUM AND PENNY: No lymphadenopathy. CHEST WALL AND LOWER NECK: Within normal limits.  ABDOMEN AND PELVIS: LIVER: Within normal limits. BILE DUCTS: Normal caliber. GALLBLADDER: Tiny gallstone. SPLEEN: Within normal limits. PANCREAS: Within normal limits. ADRENALS: Within normal limits. KIDNEYS/URETERS: Cysts.  BLADDER: Within normal limits. REPRODUCTIVE ORGANS: Within normal limits.  BOWEL: No bowel obstruction. The appendix is normal.  Periampullary duodenal diverticulum. PERITONEUM/RETROPERITONEUM: Within normal limits. VESSELS:  Within normal limits. LYMPH NODES: Within normal limits. ABDOMINAL WALL: Within normal limits. BONES: Widespread sclerotic metastases are again noted.  IMPRESSION: Small pleural effusions.  Widespread sclerotic metastases are again noted.

## 2025-03-10 NOTE — HISTORY OF PRESENT ILLNESS
[FreeTextEntry1] : 89yoM with prostate cancer presents for follow up palliative care visit, referred by Dr. Marmolejo.  PMH significant for DM, GERD, HLD,   Pt had history of elevated PSA, 8.34 ng/mL on 5/23/201, johnathan to 10.80 ng/mL on 9/14/2021. Bone scan 3/1/2023 -- diffuse foci of osseous metastasis with uptake in ribs, cervical, thoracic and lumbar spine, manubrium, sacrum, pelvis. Most prominently in thoracic spine.  Reports intermittent midback pain, 4/10, mild intermittent bilateral elbow pain and lower leg pain, normal appetite, 10lbs weight loss in 3 weeks. Urine flow is normal, no hesitancy, urgency, gross hematuria and burning. Nocturia 2 to 3. He was started on bicalutamide on March 13, 2023, eligard inj on Feb 17 with Dr. Welsh. He will continue to have bicalutamide in a total of one month. Patient declined prostate biopsy.   Main reason for which patient is referred today is pain. He is accompanied by his daughter, Millicent.  He was previously experiencing pain in his mid-back. Received 5 fractions of RT to T spine two weeks ago, this helped his pain.  Patient reports recent onset of numbness and weakness that came on about 3 days ago. He correlates this to around the time he received IV contrast for imaging.  His daughter states that the symptoms preceded that. Additionally he has pain in his thighs for which he is wearing salonpas patches. In particular, his LLE is experiencing hyperesthesia.   Gabapentin 100mg was recently prescribed by his PCP for shingles of the flank. He is using it on PRN basis.  Had been using Oxycodone/Acetaminophen 5/325 for his back pain but since the pain improved with RT he is no longer using.   Of note, he was ordered for MRI L-Spine by physiatrist Dr. Bone, it was performed at [facility] yesterday. Results pending. They are planning on initiating 3x weekly outpatient PT.   Met with cardiology recently, had a TTE performed, showed normal LVEF 60-65%, normal RV size/function, aortic sclerosis and mild TR.  He has elevated proBNP and is on Farxiga 10mg QD.  On 2/7 the furosemide was stopped. He never started the spironolactone.  On 2/16 he stopped the amlodipine.  BPs are checked daily ranging from 120s - 140s systolic over 55-95 diastolic.   Interval Hx (3/10/25): Patient seen in person for follow up visit. He is accompanied by his daughter, Millicent. He received Pluvicto x 1 on 12/12/24 under Dr. Indra Montiel at [facility].  Did not tolerate well. Millicent reports that he continues to decline. Has been sleeping about 18 hours of the 24 hour day.  He is eating less and is weaker.  He has been experiencing pain in his lower abdomen/ pelvis for the past couple of weeks, described as a throbbing sensation. Millicent took him off of gabapentin as we had discussed at last visit.  He was off of it for 1 week.  Millicent noted that pt seemed to have more pain and she was giving him Tylenol, and she put him back on the gabapentin 300mg TID.  She does not feel that he is more sedated since being back on the gabapentin but rather that the increased sedation preceded the reintroduction of gabapentin. She gives him tylenol 650mg PRN which seems to help for a few hours.   He has been weaker and his legs are sometimes buckling beneath him. Millicent finds that he is swaying sometimes.  ROS: +wears adult diaper for urinary incontinence - has been incontinent more frequently lately. Millicent notes that his urine has some sediment in it +appetite is low Denies bowel issues,   He is using a rolling walker for assistance with ambulation.  He has HHA on weekends - 7 hours/day Sat and Sun.  On weekdays he goes to the [employer] during the day.   Patient is , lives with his wife in an apartment, he recently relocated from a private house due to his mobility issues.   PCP: Dr. Awan Physiatrist: Dr. Silvio Bone ([phone]) Urologist: Dr. Dorian Welsh   I-STOP Ref#:

## 2025-03-10 NOTE — ASSESSMENT
[______] : HCP: [unfilled] [FreeTextEntry1] : 89yoM with:   # Stage IV Prostate Cancer - On Eligard, Xgeva. Med Onc follow up.  Recent CT imaging redemonstrates widespread sclerotic metastases.   # Abdominopelvic pain - ? neoplasm related - uncertain etiology - recommend continuing tylenol at a dose of 650mg and using this up to every 6 hours PRN.  On gabapentin 300mg TID,  # Fatigue - Bloodwork   # b/l LE edema -  Advise patient stop amlodipine 2.5mg as his BP has been low and it may be contributing to the edema.   #  Encounter for palliative care- emotional support provided. HCP form on file.  No MOLST on file, need to discuss at follow up.   Follow up 2 weeks, call sooner with questions or issues.

## 2025-03-10 NOTE — REASON FOR VISIT
[Home] : at home, [unfilled] , at the time of the visit. [Medical Office: ([facility])___] : at the medical office located in  [Telehealth (audio & video)] : This visit was provided via telehealth using real-time 2-way audio visual technology. [Verbal consent obtained from patient] : the patient, [unfilled] [Follow-Up] : a follow-up visit [Other: _____] : [unfilled]

## 2025-03-12 NOTE — HISTORY OF PRESENT ILLNESS
[FreeTextEntry1] : 89yoM with prostate cancer presents for follow up palliative care visit, referred by Dr. Marmolejo.  PMH significant for DM, GERD, HLD,   Pt had history of elevated PSA, 8.34 ng/mL on 5/23/201, johnathan to 10.80 ng/mL on 9/14/2021. Bone scan 3/1/2023 -- diffuse foci of osseous metastasis with uptake in ribs, cervical, thoracic and lumbar spine, manubrium, sacrum, pelvis. Most prominently in thoracic spine.  Reports intermittent midback pain, 4/10, mild intermittent bilateral elbow pain and lower leg pain, normal appetite, 10lbs weight loss in 3 weeks. Urine flow is normal, no hesitancy, urgency, gross hematuria and burning. Nocturia 2 to 3. He was started on bicalutamide on March 13, 2023, eligard inj on Feb 17 with Dr. Welsh. He will continue to have bicalutamide in a total of one month. Patient declined prostate biopsy.   Main reason for which patient is referred today is pain. He is accompanied by his daughter, Millicent.  He was previously experiencing pain in his mid-back. Received 5 fractions of RT to T spine two weeks ago, this helped his pain.  Patient reports recent onset of numbness and weakness that came on about 3 days ago. He correlates this to around the time he received IV contrast for imaging.  His daughter states that the symptoms preceded that. Additionally he has pain in his thighs for which he is wearing salonpas patches. In particular, his LLE is experiencing hyperesthesia.   Gabapentin 100mg was recently prescribed by his PCP for shingles of the flank. He is using it on PRN basis.  Had been using Oxycodone/Acetaminophen 5/325 for his back pain but since the pain improved with RT he is no longer using.   Of note, he was ordered for MRI L-Spine by physiatrist Dr. Bone, it was performed at [facility] yesterday. Results pending. They are planning on initiating 3x weekly outpatient PT.   Met with cardiology recently, had a TTE performed, showed normal LVEF 60-65%, normal RV size/function, aortic sclerosis and mild TR.  He has elevated proBNP and is on Farxiga 10mg QD.  On 2/7 the furosemide was stopped. He never started the spironolactone.  On 2/16 he stopped the amlodipine.  BPs are checked daily ranging from 120s - 140s systolic over 55-95 diastolic.   Interval Hx (3/10/25): Patient seen in person for follow up visit. He is accompanied by his daughter, Millicent. He received Pluvicto x 1 on 12/12/24 under Dr. Indra Montiel at [facility].  Did not tolerate well. Millicent reports that he continues to decline. Has been sleeping about 18 hours of the 24 hour day.  He is eating less and is weaker.  She gives him tylenol 650mg PRN which seems to help for a few hours.  He continues to use gabapentin 300mg TID.  ROS: +wears adult diaper for urinary incontinence - has been incontinent more frequently lately. Millicent notes that his urine has some sediment in it +appetite is low Denies bowel issues,   He is using a rolling walker for assistance with ambulation.  He has HHA on weekends - 7 hours/day Sat and Sun.  On weekdays he goes to the [facility] during the day.   Patient is , lives with his wife in an apartment, he recently relocated from a private house due to his mobility issues.   PCP: Dr. Awan Physiatrist: Dr. Silvio Bone ([phone]) Urologist: Dr. Dorian Welsh   I-STOP Ref#: [id number]

## 2025-03-12 NOTE — PHYSICAL EXAM
[General Appearance - Alert] : alert [General Appearance - In No Acute Distress] : in no acute distress [Oriented To Time, Place, And Person] : oriented to person, place, and time [Affect] : the affect was normal [FreeTextEntry1] : elderly male [Sclera] : the sclera and conjunctiva were normal [Normal Oral Mucosa] : normal oral mucosa [Neck Appearance] : the appearance of the neck was normal [] : no respiratory distress [Auscultation Breath Sounds / Voice Sounds] : lungs were clear to auscultation bilaterally [Heart Rate And Rhythm] : heart rate was normal and rhythm regular [Heart Sounds] : normal S1 and S2 [Edema] : there was no peripheral edema [Bowel Sounds] : normal bowel sounds [Abdomen Soft] : soft [Skin Color & Pigmentation] : normal skin color and pigmentation

## 2025-03-12 NOTE — ASSESSMENT
[FreeTextEntry1] : 89yoM with:   # Stage IV Prostate Cancer - On Eligard, Xgeva. Med Onc follow up.  Recent CT imaging redemonstrates widespread sclerotic metastases.   # Abdominopelvic pain - ? neoplasm related - uncertain etiology - recommend continuing tylenol at a dose of 650mg and using this up to every 6 hours PRN.  On gabapentin 300mg TID,  # Fatigue - Bloodwork unrevealing of acute issues to explain patient's progressive fatigue.  Discussed patient's advanced cancer and age as likely drivers of the progressive fatigue.   # b/l LE edema -  Has improved since coming off of amlodipine.  #  Encounter for palliative care- emotional support provided. HCP form on file.  No MOLST on file. Provided a form to patient's daughter. She needs to review with her brother/patient's primary HCP.  Again discussed hospice care. She will discuss this with her brother as well.   Follow up 1 week, call sooner with questions or issues.

## 2025-03-17 NOTE — HISTORY OF PRESENT ILLNESS
[FreeTextEntry1] : 89yoM with prostate cancer presents for follow up palliative care visit, referred by Dr. Marmolejo.  PMH significant for DM, GERD, HLD,   Pt had history of elevated PSA, 8.34 ng/mL on 5/23/201, johnathan to 10.80 ng/mL on 9/14/2021. Bone scan 3/1/2023 -- diffuse foci of osseous metastasis with uptake in ribs, cervical, thoracic and lumbar spine, manubrium, sacrum, pelvis. Most prominently in thoracic spine.  Reports intermittent midback pain, 4/10, mild intermittent bilateral elbow pain and lower leg pain, normal appetite, 10lbs weight loss in 3 weeks. Urine flow is normal, no hesitancy, urgency, gross hematuria and burning. Nocturia 2 to 3. He was started on bicalutamide on March 13, 2023, eligard inj on Feb 17 with Dr. Welsh. He will continue to have bicalutamide in a total of one month. Patient declined prostate biopsy.   Main reason for which patient is referred today is pain. He is accompanied by his daughter, Millicent.  He was previously experiencing pain in his mid-back. Received 5 fractions of RT to T spine two weeks ago, this helped his pain.  Patient reports recent onset of numbness and weakness that came on about 3 days ago. He correlates this to around the time he received IV contrast for imaging.  His daughter states that the symptoms preceded that. Additionally he has pain in his thighs for which he is wearing salonpas patches. In particular, his LLE is experiencing hyperesthesia.   Gabapentin 100mg was recently prescribed by his PCP for shingles of the flank. He is using it on PRN basis.  Had been using Oxycodone/Acetaminophen 5/325 for his back pain but since the pain improved with RT he is no longer using.   Of note, he was ordered for MRI L-Spine by physiatrist Dr. Bnoe, it was performed at [facility] yesterday. Results pending. They are planning on initiating 3x weekly outpatient PT.   Met with cardiology recently, had a TTE performed, showed normal LVEF 60-65%, normal RV size/function, aortic sclerosis and mild TR.  He has elevated proBNP and is on Farxiga 10mg QD.  On 2/7 the furosemide was stopped. He never started the spironolactone.  On 2/16 he stopped the amlodipine.  BPs are checked daily ranging from 120s - 140s systolic over 55-95 diastolic.   Interval Hx (3/10/25): Patient seen in person for follow up visit. He is accompanied by his daughter, Millicent. He received Pluvicto x 1 on 12/12/24 under Dr. Indra Montiel at [facility].  Did not tolerate well. Millicent reports that he continues to decline. Has been sleeping about 18 hours of the 24 hour day.  He is eating less and is weaker.  She gives him tylenol 650mg PRN which seems to help for a few hours.  He continues to use gabapentin 300mg TID.  ROS: +wears adult diaper for urinary incontinence - has been incontinent more frequently lately. Millicent notes that his urine has some sediment in it +appetite is low Denies bowel issues,   He is using a rolling walker for assistance with ambulation.  He has HHA on weekends - 7 hours/day Sat and Sun.  On weekdays he goes to the [facility] during the day.   Patient is , lives with his wife in an apartment, he recently relocated from a private house due to his mobility issues.   PCP: Dr. Awan Physiatrist: Dr. Silvio Bone ([phone]) Urologist: Dr. Dorian Welsh   I-STOP Ref#: [id number]